# Patient Record
Sex: MALE | Race: WHITE | NOT HISPANIC OR LATINO | Employment: FULL TIME | URBAN - METROPOLITAN AREA
[De-identification: names, ages, dates, MRNs, and addresses within clinical notes are randomized per-mention and may not be internally consistent; named-entity substitution may affect disease eponyms.]

---

## 2022-05-04 ENCOUNTER — TELEPHONE (OUTPATIENT)
Dept: ONCOLOGY | Facility: CLINIC | Age: 62
End: 2022-05-04

## 2022-05-04 ENCOUNTER — APPOINTMENT (OUTPATIENT)
Dept: CARDIOLOGY | Facility: HOSPITAL | Age: 62
End: 2022-05-04

## 2022-05-04 ENCOUNTER — HOSPITAL ENCOUNTER (EMERGENCY)
Facility: HOSPITAL | Age: 62
Discharge: HOME | End: 2022-05-04
Attending: EMERGENCY MEDICINE | Admitting: EMERGENCY MEDICINE

## 2022-05-04 VITALS
BODY MASS INDEX: 24.92 KG/M2 | TEMPERATURE: 97.7 F | OXYGEN SATURATION: 96 % | WEIGHT: 184 LBS | RESPIRATION RATE: 18 BRPM | HEART RATE: 69 BPM | DIASTOLIC BLOOD PRESSURE: 93 MMHG | SYSTOLIC BLOOD PRESSURE: 158 MMHG | HEIGHT: 72 IN

## 2022-05-04 DIAGNOSIS — I82.4Z1 LOWER LEG DVT (DEEP VENOUS THROMBOEMBOLISM), ACUTE, RIGHT: Primary | ICD-10-CM

## 2022-05-04 LAB
BH CV LOW VAS RIGHT DISTAL FEMORAL SPONT: 1
BH CV LOW VAS RIGHT GASTRONEMIUS VESSEL: 1
BH CV LOW VAS RIGHT MID FEMORAL SPONT: 1
BH CV LOW VAS RIGHT PERONEAL VESSEL: 1
BH CV LOW VAS RIGHT POPLITEAL SPONT: 1
BH CV LOW VAS RIGHT POSTERIOR TIBIAL VESSEL: 1
BH CV LOWER VASCULAR LEFT COMMON FEMORAL AUGMENT: NORMAL
BH CV LOWER VASCULAR LEFT COMMON FEMORAL COMPRESS: NORMAL
BH CV LOWER VASCULAR LEFT COMMON FEMORAL PHASIC: NORMAL
BH CV LOWER VASCULAR LEFT COMMON FEMORAL SPONT: NORMAL
BH CV LOWER VASCULAR RIGHT COMMON FEMORAL AUGMENT: NORMAL
BH CV LOWER VASCULAR RIGHT COMMON FEMORAL COMPRESS: NORMAL
BH CV LOWER VASCULAR RIGHT COMMON FEMORAL PHASIC: NORMAL
BH CV LOWER VASCULAR RIGHT COMMON FEMORAL SPONT: NORMAL
BH CV LOWER VASCULAR RIGHT DISTAL FEMORAL COMPRESS: NORMAL
BH CV LOWER VASCULAR RIGHT DISTAL FEMORAL PHASIC: NORMAL
BH CV LOWER VASCULAR RIGHT DISTAL FEMORAL SPONT: NORMAL
BH CV LOWER VASCULAR RIGHT GASTRONEMIUS COMPRESS: NORMAL
BH CV LOWER VASCULAR RIGHT GREATER SAPH AK COMPRESS: NORMAL
BH CV LOWER VASCULAR RIGHT GREATER SAPH BK COMPRESS: NORMAL
BH CV LOWER VASCULAR RIGHT LESSER SAPH COMPRESS: NORMAL
BH CV LOWER VASCULAR RIGHT MID FEMORAL COMPRESS: NORMAL
BH CV LOWER VASCULAR RIGHT MID FEMORAL PHASIC: NORMAL
BH CV LOWER VASCULAR RIGHT MID FEMORAL SPONT: NORMAL
BH CV LOWER VASCULAR RIGHT PERONEAL COMPRESS: NORMAL
BH CV LOWER VASCULAR RIGHT POPLITEAL COMPRESS: NORMAL
BH CV LOWER VASCULAR RIGHT POPLITEAL PHASIC: NORMAL
BH CV LOWER VASCULAR RIGHT POPLITEAL SPONT: NORMAL
BH CV LOWER VASCULAR RIGHT POSTERIOR TIBIAL COMPRESS: NORMAL
BH CV LOWER VASCULAR RIGHT PROFUNDA FEMORAL COMPRESS: NORMAL
BH CV LOWER VASCULAR RIGHT PROFUNDA FEMORAL PHASIC: NORMAL
BH CV LOWER VASCULAR RIGHT PROFUNDA FEMORAL SPONT: NORMAL
BH CV LOWER VASCULAR RIGHT PROXIMAL FEMORAL COMPRESS: NORMAL
BH CV LOWER VASCULAR RIGHT PROXIMAL FEMORAL PHASIC: NORMAL
BH CV LOWER VASCULAR RIGHT PROXIMAL FEMORAL SPONT: NORMAL
BH CV LOWER VASCULAR RIGHT SAPHENOFEMORAL JUNCTION COMPRESS: NORMAL
BH CV LOWER VASCULAR RIGHT SAPHENOFEMORAL JUNCTION PHASIC: NORMAL
BH CV LOWER VASCULAR RIGHT SAPHENOFEMORAL JUNCTION SPONT: NORMAL
MAXIMAL PREDICTED HEART RATE: 157 BPM
STRESS TARGET HR: 133 BPM

## 2022-05-04 PROCEDURE — 93971 EXTREMITY STUDY: CPT

## 2022-05-04 PROCEDURE — 93971 EXTREMITY STUDY: CPT | Performed by: INTERNAL MEDICINE

## 2022-05-04 PROCEDURE — 99283 EMERGENCY DEPT VISIT LOW MDM: CPT

## 2022-05-04 RX ADMIN — APIXABAN 10 MG: 5 TABLET, FILM COATED ORAL at 13:20

## 2022-05-04 NOTE — CASE MANAGEMENT/SOCIAL WORK
Continued Stay Note  [facility]     Patient Name: Anibal Redding  MRN: [id number]  Today's Date: 5/4/2022    Admit Date: 5/4/2022     Discharge Plan     Row Name 05/04/22 1532       Plan    Plan SW follow up    Patient/Family in Agreement with Plan yes    Plan Comments Provided PA with an Eliquis discount card for pt. upon d/c. MSW is available.    Final Discharge Disposition Code 01 - home or self-care               Discharge Codes    No documentation.                     MARLENY Sam

## 2022-05-04 NOTE — ED PROVIDER NOTES
"Subjective   Mr. Redding is a 62-year-old male who presents to the emergency department with complaints of right lower leg pain and swelling.  The patient states that his symptoms began 3 days ago.  He recalls no injury.  The pain is mostly in the right posterior knee and in the right calf.  The patient notes that his mother has had multiple DVTs in the past.  The patient recently fractured his right foot about 8 weeks ago and was in a boot up until recently.  He states that his foot is now doing fine.  He also had a wart removed from the right posterior knee about 3 weeks ago.  He states they \"numbed it up and scraped it off\".  The patient denies any prior history of DVT.  No recent immobility other than some decreased activity from his foot fracture.  The patient denies any chest pain or shortness of breath.  Past medical history includes non-insulin-dependent diabetes and hypertension.  He has had a short segment of colon resected about 16 years ago secondary to diverticulitis.  He has had no GI bleeds or other GI issues since then.  He is a non-smoker.  No alcohol use.          Review of Systems   Constitutional: Negative for chills and fever.   HENT: Negative for sore throat.    Respiratory: Negative for cough and shortness of breath.    Cardiovascular: Negative for chest pain.   Gastrointestinal: Negative for abdominal pain, blood in stool, nausea and vomiting.   Genitourinary: Negative for dysuria.   Musculoskeletal:        Right posterior knee and right calf pain and swelling   Skin: Negative for rash.   Neurological: Negative for numbness.   Hematological: Does not bruise/bleed easily.   Psychiatric/Behavioral: Negative.        Past Medical History:   Diagnosis Date   • Arthritis    • Diabetes mellitus (HCC)    • Diverticulitis    • Hypertension        Allergies   Allergen Reactions   • Cefaclor Hives   • Amlodipine Other (See Comments)   • Benazepril Other (See Comments)     Rash on arms, neck       Past " Surgical History:   Procedure Laterality Date   • COLON SURGERY         History reviewed. No pertinent family history.    Social History     Socioeconomic History   • Marital status:    Tobacco Use   • Smoking status: Never Smoker   • Smokeless tobacco: Never Used   Vaping Use   • Vaping Use: Never used   Substance and Sexual Activity   • Alcohol use: Yes     Comment: once a month   • Drug use: Never   • Sexual activity: Defer           Objective   Physical Exam  Constitutional:       General: He is not in acute distress.     Appearance: Normal appearance.   HENT:      Head: Normocephalic.      Nose: Nose normal.      Mouth/Throat:      Mouth: Mucous membranes are moist.   Eyes:      General: No scleral icterus.     Conjunctiva/sclera: Conjunctivae normal.      Pupils: Pupils are equal, round, and reactive to light.   Cardiovascular:      Rate and Rhythm: Normal rate and regular rhythm.      Pulses: Normal pulses.      Comments: Normal pedal pulses  Pulmonary:      Effort: Pulmonary effort is normal.   Abdominal:      General: Bowel sounds are normal.      Tenderness: There is no abdominal tenderness. There is no guarding.   Musculoskeletal:      Cervical back: Normal range of motion and neck supple.      Comments: Moderate right lower leg swelling.  Moderate tenderness on palpation of the right calf and right popliteal fossa.  No palpable cords.   Skin:     General: Skin is warm and dry.      Findings: No erythema or rash.   Neurological:      General: No focal deficit present.      Mental Status: He is alert and oriented to person, place, and time.   Psychiatric:         Mood and Affect: Mood normal.         Procedures           ED Course      The patient was sent for Doppler ultrasound of the right lower extremity.  The patient declined anything for pain at this time.    13:12 EDT  Doppler ultrasound right lower extremity:  Right mid-distal femoral vein, popliteal vein, gastrocnemius vein, posterior tibial  "vein, and peroneal vein appears positive for thrombus.    I spoke with the patient about his ultrasound results.  I will plan to start him on Eliquis and have him follow-up with his PCP and with hematology.  The patient has been advised to avoid massaging the leg.  He has been advised to return to the emergency department if he has chest pain or shortness of breath or other acute concerns.  He is to follow-up with his PCP and I will refer to hematology as well.    13:12 EDT  I spoke with Dr. Anne-Marie Jacob by phone about the pt.  She advised that they would be happy to see the pt in follow up.  She advised to refer the pt in Epic and their  would \"take it from there\".                                               MDM    Final diagnoses:   Lower leg DVT (deep venous thromboembolism), acute, right (HCC)       ED Disposition  ED Disposition     ED Disposition   Discharge    Condition   Stable    Comment   --             Andrew Powell MD  [address] [facility] [address] [phone]      Call today for follow-up appointment    Anne-Marie Jacob MD  [address] [facility] [address]  [place] [id number]  [phone]      call for follow up in office for evaluation for possible blood clotting disorder         Medication List      New Prescriptions    Apixaban Starter Pack tablet therapy pack  Take two 5 mg tablets by mouth every 12 hours for 7 days. Followed by one 5 mg tablet every 12 hours. (Dispense starter pack if available)        Stop    aspirin 325 MG tablet     celecoxib 200 MG capsule  Commonly known as: CeleBREX     rosuvastatin 10 MG tablet  Commonly known as: CRESTOR           Where to Get Your Medications      These medications were sent to [facility] - [place], [place] - [address] - [phone]  - [phone] FX  [address] [facility] [address]    Phone: [phone]   · Apixaban Starter Pack tablet therapy pack          Pablo Henriquez, " PA  05/04/22 4907

## 2022-05-04 NOTE — TELEPHONE ENCOUNTER
Caller: ELIZ COSTELLO    Relationship: Emergency Contact    Best call back number: [phone]    What is the best time to reach you: ASAP    Who are you requesting to speak with (clinical staff, provider,  specific staff member):     Do you know the name of the person who called:     What was the call regarding: PT WANTS TO SCHEDULE FOLLOW UP    Do you require a callback: YES

## 2022-06-30 ENCOUNTER — APPOINTMENT (OUTPATIENT)
Dept: CT IMAGING | Facility: HOSPITAL | Age: 62
End: 2022-06-30

## 2022-06-30 ENCOUNTER — HOSPITAL ENCOUNTER (OUTPATIENT)
Facility: HOSPITAL | Age: 62
Discharge: HOME | End: 2022-07-02
Attending: STUDENT IN AN ORGANIZED HEALTH CARE EDUCATION/TRAINING PROGRAM | Admitting: SURGERY

## 2022-06-30 DIAGNOSIS — E87.20 LACTIC ACIDOSIS: ICD-10-CM

## 2022-06-30 DIAGNOSIS — E11.65 HYPERGLYCEMIA DUE TO DIABETES MELLITUS: ICD-10-CM

## 2022-06-30 DIAGNOSIS — R10.9 ABDOMINAL PAIN: ICD-10-CM

## 2022-06-30 DIAGNOSIS — K81.0 ACUTE CHOLECYSTITIS: Primary | ICD-10-CM

## 2022-06-30 LAB
ALBUMIN SERPL-MCNC: 4.2 G/DL (ref 3.5–5.2)
ALBUMIN/GLOB SERPL: 1.5 G/DL
ALP SERPL-CCNC: 93 U/L (ref 39–117)
ALT SERPL W P-5'-P-CCNC: 11 U/L (ref 1–41)
ANION GAP SERPL CALCULATED.3IONS-SCNC: 8 MMOL/L (ref 5–15)
AST SERPL-CCNC: 13 U/L (ref 1–40)
BASOPHILS # BLD AUTO: 0.07 10*3/MM3 (ref 0–0.2)
BASOPHILS NFR BLD AUTO: 0.8 % (ref 0–1.5)
BILIRUB SERPL-MCNC: 0.6 MG/DL (ref 0–1.2)
BUN SERPL-MCNC: 14 MG/DL (ref 8–23)
BUN/CREAT SERPL: 12.3 (ref 7–25)
CALCIUM SPEC-SCNC: 9.4 MG/DL (ref 8.6–10.5)
CHLORIDE SERPL-SCNC: 95 MMOL/L (ref 98–107)
CO2 SERPL-SCNC: 29 MMOL/L (ref 22–29)
CREAT SERPL-MCNC: 1.14 MG/DL (ref 0.76–1.27)
D-LACTATE SERPL-SCNC: 1.8 MMOL/L (ref 0.5–2)
DEPRECATED RDW RBC AUTO: 38 FL (ref 37–54)
EGFRCR SERPLBLD CKD-EPI 2021: 72.3 ML/MIN/1.73
EOSINOPHIL # BLD AUTO: 0.14 10*3/MM3 (ref 0–0.4)
EOSINOPHIL NFR BLD AUTO: 1.5 % (ref 0.3–6.2)
ERYTHROCYTE [DISTWIDTH] IN BLOOD BY AUTOMATED COUNT: 13.1 % (ref 12.3–15.4)
GLOBULIN UR ELPH-MCNC: 2.8 GM/DL
GLUCOSE SERPL-MCNC: 271 MG/DL (ref 65–99)
HCT VFR BLD AUTO: 47.7 % (ref 37.5–51)
HGB BLD-MCNC: 16.4 G/DL (ref 13–17.7)
HOLD SPECIMEN: NORMAL
IMM GRANULOCYTES # BLD AUTO: 0.04 10*3/MM3 (ref 0–0.05)
IMM GRANULOCYTES NFR BLD AUTO: 0.4 % (ref 0–0.5)
LIPASE SERPL-CCNC: 23 U/L (ref 13–60)
LYMPHOCYTES # BLD AUTO: 1.14 10*3/MM3 (ref 0.7–3.1)
LYMPHOCYTES NFR BLD AUTO: 12.4 % (ref 19.6–45.3)
MCH RBC QN AUTO: 27.9 PG (ref 26.6–33)
MCHC RBC AUTO-ENTMCNC: 34.4 G/DL (ref 31.5–35.7)
MCV RBC AUTO: 81.3 FL (ref 79–97)
MONOCYTES # BLD AUTO: 0.59 10*3/MM3 (ref 0.1–0.9)
MONOCYTES NFR BLD AUTO: 6.4 % (ref 5–12)
NEUTROPHILS NFR BLD AUTO: 7.18 10*3/MM3 (ref 1.7–7)
NEUTROPHILS NFR BLD AUTO: 78.5 % (ref 42.7–76)
NRBC BLD AUTO-RTO: 0 /100 WBC (ref 0–0.2)
PLATELET # BLD AUTO: 145 10*3/MM3 (ref 140–450)
PMV BLD AUTO: 8.8 FL (ref 6–12)
POTASSIUM SERPL-SCNC: 3.8 MMOL/L (ref 3.5–5.2)
PROT SERPL-MCNC: 7 G/DL (ref 6–8.5)
RBC # BLD AUTO: 5.87 10*6/MM3 (ref 4.14–5.8)
SODIUM SERPL-SCNC: 132 MMOL/L (ref 136–145)
WBC NRBC COR # BLD: 9.16 10*3/MM3 (ref 3.4–10.8)
WHOLE BLOOD HOLD COAG: NORMAL
WHOLE BLOOD HOLD SPECIMEN: NORMAL

## 2022-06-30 PROCEDURE — 25010000002 ONDANSETRON PER 1 MG: Performed by: STUDENT IN AN ORGANIZED HEALTH CARE EDUCATION/TRAINING PROGRAM

## 2022-06-30 PROCEDURE — 71275 CT ANGIOGRAPHY CHEST: CPT

## 2022-06-30 PROCEDURE — 99284 EMERGENCY DEPT VISIT MOD MDM: CPT

## 2022-06-30 PROCEDURE — 96361 HYDRATE IV INFUSION ADD-ON: CPT

## 2022-06-30 PROCEDURE — 85025 COMPLETE CBC W/AUTO DIFF WBC: CPT

## 2022-06-30 PROCEDURE — 80053 COMPREHEN METABOLIC PANEL: CPT

## 2022-06-30 PROCEDURE — 83690 ASSAY OF LIPASE: CPT

## 2022-06-30 PROCEDURE — 96375 TX/PRO/DX INJ NEW DRUG ADDON: CPT

## 2022-06-30 PROCEDURE — 0 IOPAMIDOL PER 1 ML: Performed by: STUDENT IN AN ORGANIZED HEALTH CARE EDUCATION/TRAINING PROGRAM

## 2022-06-30 PROCEDURE — 83605 ASSAY OF LACTIC ACID: CPT

## 2022-06-30 PROCEDURE — 25010000002 KETOROLAC TROMETHAMINE PER 15 MG: Performed by: STUDENT IN AN ORGANIZED HEALTH CARE EDUCATION/TRAINING PROGRAM

## 2022-06-30 PROCEDURE — 74177 CT ABD & PELVIS W/CONTRAST: CPT

## 2022-06-30 RX ORDER — KETOROLAC TROMETHAMINE 15 MG/ML
15 INJECTION, SOLUTION INTRAMUSCULAR; INTRAVENOUS ONCE
Status: COMPLETED | OUTPATIENT
Start: 2022-06-30 | End: 2022-06-30

## 2022-06-30 RX ORDER — SODIUM CHLORIDE 9 MG/ML
10 INJECTION INTRAVENOUS AS NEEDED
Status: DISCONTINUED | OUTPATIENT
Start: 2022-06-30 | End: 2022-07-02 | Stop reason: HOSPADM

## 2022-06-30 RX ORDER — ONDANSETRON 2 MG/ML
4 INJECTION INTRAMUSCULAR; INTRAVENOUS ONCE
Status: COMPLETED | OUTPATIENT
Start: 2022-06-30 | End: 2022-06-30

## 2022-06-30 RX ADMIN — IOPAMIDOL 100 ML: 755 INJECTION, SOLUTION INTRAVENOUS at 22:56

## 2022-06-30 RX ADMIN — KETOROLAC TROMETHAMINE 15 MG: 15 INJECTION, SOLUTION INTRAMUSCULAR; INTRAVENOUS at 23:47

## 2022-06-30 RX ADMIN — ONDANSETRON 4 MG: 2 INJECTION INTRAMUSCULAR; INTRAVENOUS at 23:47

## 2022-06-30 RX ADMIN — SODIUM CHLORIDE, POTASSIUM CHLORIDE, SODIUM LACTATE AND CALCIUM CHLORIDE 1000 ML: 600; 310; 30; 20 INJECTION, SOLUTION INTRAVENOUS at 23:50

## 2022-07-01 ENCOUNTER — ANESTHESIA (OUTPATIENT)
Dept: PERIOP | Facility: HOSPITAL | Age: 62
End: 2022-07-01

## 2022-07-01 ENCOUNTER — ANESTHESIA EVENT (OUTPATIENT)
Dept: PERIOP | Facility: HOSPITAL | Age: 62
End: 2022-07-01

## 2022-07-01 ENCOUNTER — APPOINTMENT (OUTPATIENT)
Dept: GENERAL RADIOLOGY | Facility: HOSPITAL | Age: 62
End: 2022-07-01

## 2022-07-01 PROBLEM — E87.1 HYPONATREMIA: Status: ACTIVE | Noted: 2022-07-01

## 2022-07-01 PROBLEM — E78.5 HYPERLIPIDEMIA: Status: ACTIVE | Noted: 2022-07-01

## 2022-07-01 PROBLEM — I82.409 DVT (DEEP VENOUS THROMBOSIS): Status: ACTIVE | Noted: 2022-07-01

## 2022-07-01 PROBLEM — K81.0 ACUTE CHOLECYSTITIS: Status: ACTIVE | Noted: 2022-07-01

## 2022-07-01 PROBLEM — E11.9 TYPE 2 DIABETES MELLITUS: Status: ACTIVE | Noted: 2022-07-01

## 2022-07-01 PROBLEM — I10 ESSENTIAL HYPERTENSION: Status: ACTIVE | Noted: 2022-07-01

## 2022-07-01 LAB
ANION GAP SERPL CALCULATED.3IONS-SCNC: 9 MMOL/L (ref 5–15)
BASOPHILS # BLD AUTO: 0.03 10*3/MM3 (ref 0–0.2)
BASOPHILS NFR BLD AUTO: 0.3 % (ref 0–1.5)
BUN SERPL-MCNC: 11 MG/DL (ref 8–23)
BUN/CREAT SERPL: 11.2 (ref 7–25)
CALCIUM SPEC-SCNC: 8.3 MG/DL (ref 8.6–10.5)
CHLORIDE SERPL-SCNC: 101 MMOL/L (ref 98–107)
CHOLEST SERPL-MCNC: 202 MG/DL (ref 0–200)
CO2 SERPL-SCNC: 25 MMOL/L (ref 22–29)
CREAT SERPL-MCNC: 0.98 MG/DL (ref 0.76–1.27)
D-LACTATE SERPL-SCNC: 1.2 MMOL/L (ref 0.5–2)
D-LACTATE SERPL-SCNC: 3.6 MMOL/L (ref 0.5–2)
DEPRECATED RDW RBC AUTO: 39.3 FL (ref 37–54)
EGFRCR SERPLBLD CKD-EPI 2021: 86.6 ML/MIN/1.73
EOSINOPHIL # BLD AUTO: 0.12 10*3/MM3 (ref 0–0.4)
EOSINOPHIL NFR BLD AUTO: 1.4 % (ref 0.3–6.2)
ERYTHROCYTE [DISTWIDTH] IN BLOOD BY AUTOMATED COUNT: 13.3 % (ref 12.3–15.4)
FLUAV RNA RESP QL NAA+PROBE: NOT DETECTED
FLUBV RNA RESP QL NAA+PROBE: NOT DETECTED
GLUCOSE BLDC GLUCOMTR-MCNC: 156 MG/DL (ref 70–130)
GLUCOSE BLDC GLUCOMTR-MCNC: 177 MG/DL (ref 70–130)
GLUCOSE BLDC GLUCOMTR-MCNC: 191 MG/DL (ref 70–130)
GLUCOSE BLDC GLUCOMTR-MCNC: 354 MG/DL (ref 70–130)
GLUCOSE SERPL-MCNC: 181 MG/DL (ref 65–99)
HBA1C MFR BLD: 8.9 % (ref 4.8–5.6)
HCT VFR BLD AUTO: 42.8 % (ref 37.5–51)
HDLC SERPL-MCNC: 25 MG/DL (ref 40–60)
HGB BLD-MCNC: 14.5 G/DL (ref 13–17.7)
IMM GRANULOCYTES # BLD AUTO: 0.03 10*3/MM3 (ref 0–0.05)
IMM GRANULOCYTES NFR BLD AUTO: 0.3 % (ref 0–0.5)
LDLC SERPL CALC-MCNC: 89 MG/DL (ref 0–100)
LDLC/HDLC SERPL: 2.82 {RATIO}
LYMPHOCYTES # BLD AUTO: 0.78 10*3/MM3 (ref 0.7–3.1)
LYMPHOCYTES NFR BLD AUTO: 9.1 % (ref 19.6–45.3)
MCH RBC QN AUTO: 27.7 PG (ref 26.6–33)
MCHC RBC AUTO-ENTMCNC: 33.9 G/DL (ref 31.5–35.7)
MCV RBC AUTO: 81.7 FL (ref 79–97)
MONOCYTES # BLD AUTO: 0.69 10*3/MM3 (ref 0.1–0.9)
MONOCYTES NFR BLD AUTO: 8 % (ref 5–12)
NEUTROPHILS NFR BLD AUTO: 6.93 10*3/MM3 (ref 1.7–7)
NEUTROPHILS NFR BLD AUTO: 80.9 % (ref 42.7–76)
NRBC BLD AUTO-RTO: 0 /100 WBC (ref 0–0.2)
PLATELET # BLD AUTO: 130 10*3/MM3 (ref 140–450)
PMV BLD AUTO: 9.1 FL (ref 6–12)
POTASSIUM SERPL-SCNC: 3.6 MMOL/L (ref 3.5–5.2)
QT INTERVAL: 400 MS
QTC INTERVAL: 452 MS
RBC # BLD AUTO: 5.24 10*6/MM3 (ref 4.14–5.8)
SARS-COV-2 RNA RESP QL NAA+PROBE: NOT DETECTED
SODIUM SERPL-SCNC: 135 MMOL/L (ref 136–145)
TRIGL SERPL-MCNC: 533 MG/DL (ref 0–150)
VLDLC SERPL-MCNC: 88 MG/DL (ref 5–40)
WBC NRBC COR # BLD: 8.58 10*3/MM3 (ref 3.4–10.8)

## 2022-07-01 PROCEDURE — 63710000001 INSULIN LISPRO (HUMAN) PER 5 UNITS: Performed by: PHYSICIAN ASSISTANT

## 2022-07-01 PROCEDURE — 25010000002 PIPERACILLIN SOD-TAZOBACTAM PER 1 G

## 2022-07-01 PROCEDURE — 63710000001 INSULIN LISPRO (HUMAN) PER 5 UNITS: Performed by: SURGERY

## 2022-07-01 PROCEDURE — 88304 TISSUE EXAM BY PATHOLOGIST: CPT | Performed by: SURGERY

## 2022-07-01 PROCEDURE — 87636 SARSCOV2 & INF A&B AMP PRB: CPT | Performed by: INTERNAL MEDICINE

## 2022-07-01 PROCEDURE — 80048 BASIC METABOLIC PNL TOTAL CA: CPT | Performed by: PHYSICIAN ASSISTANT

## 2022-07-01 PROCEDURE — 25010000002 IOPAMIDOL 61 % SOLUTION: Performed by: SURGERY

## 2022-07-01 PROCEDURE — 0 LIDOCAINE 1 % SOLUTION: Performed by: NURSE ANESTHETIST, CERTIFIED REGISTERED

## 2022-07-01 PROCEDURE — 25010000002 HYDROMORPHONE 1 MG/ML SOLUTION

## 2022-07-01 PROCEDURE — 93010 ELECTROCARDIOGRAM REPORT: CPT | Performed by: INTERNAL MEDICINE

## 2022-07-01 PROCEDURE — G0378 HOSPITAL OBSERVATION PER HR: HCPCS

## 2022-07-01 PROCEDURE — 25010000002 DEXAMETHASONE PER 1 MG: Performed by: NURSE ANESTHETIST, CERTIFIED REGISTERED

## 2022-07-01 PROCEDURE — 63710000001 INSULIN LISPRO (HUMAN) PER 5 UNITS: Performed by: NURSE PRACTITIONER

## 2022-07-01 PROCEDURE — 96365 THER/PROPH/DIAG IV INF INIT: CPT

## 2022-07-01 PROCEDURE — 25010000002 PIPERACILLIN SOD-TAZOBACTAM PER 1 G: Performed by: SURGERY

## 2022-07-01 PROCEDURE — 96375 TX/PRO/DX INJ NEW DRUG ADDON: CPT

## 2022-07-01 PROCEDURE — 25010000002 ONDANSETRON PER 1 MG: Performed by: NURSE ANESTHETIST, CERTIFIED REGISTERED

## 2022-07-01 PROCEDURE — 96376 TX/PRO/DX INJ SAME DRUG ADON: CPT

## 2022-07-01 PROCEDURE — 96361 HYDRATE IV INFUSION ADD-ON: CPT

## 2022-07-01 PROCEDURE — 25010000002 HYDROMORPHONE PER 4 MG: Performed by: INTERNAL MEDICINE

## 2022-07-01 PROCEDURE — 25010000002 PIPERACILLIN SOD-TAZOBACTAM PER 1 G: Performed by: STUDENT IN AN ORGANIZED HEALTH CARE EDUCATION/TRAINING PROGRAM

## 2022-07-01 PROCEDURE — 85025 COMPLETE CBC W/AUTO DIFF WBC: CPT | Performed by: PHYSICIAN ASSISTANT

## 2022-07-01 PROCEDURE — 25010000002 DIPHENHYDRAMINE PER 50 MG: Performed by: STUDENT IN AN ORGANIZED HEALTH CARE EDUCATION/TRAINING PROGRAM

## 2022-07-01 PROCEDURE — 99220 PR INITIAL OBSERVATION CARE/DAY 70 MINUTES: CPT | Performed by: PHYSICIAN ASSISTANT

## 2022-07-01 PROCEDURE — 25010000002 FENTANYL CITRATE (PF) 50 MCG/ML SOLUTION: Performed by: NURSE ANESTHETIST, CERTIFIED REGISTERED

## 2022-07-01 PROCEDURE — 25010000002 FENTANYL CITRATE (PF) 50 MCG/ML SOLUTION

## 2022-07-01 PROCEDURE — 93005 ELECTROCARDIOGRAM TRACING: CPT | Performed by: ANESTHESIOLOGY

## 2022-07-01 PROCEDURE — 87040 BLOOD CULTURE FOR BACTERIA: CPT | Performed by: STUDENT IN AN ORGANIZED HEALTH CARE EDUCATION/TRAINING PROGRAM

## 2022-07-01 PROCEDURE — 82962 GLUCOSE BLOOD TEST: CPT

## 2022-07-01 PROCEDURE — 83036 HEMOGLOBIN GLYCOSYLATED A1C: CPT | Performed by: PHYSICIAN ASSISTANT

## 2022-07-01 PROCEDURE — C9803 HOPD COVID-19 SPEC COLLECT: HCPCS

## 2022-07-01 PROCEDURE — 74300 X-RAY BILE DUCTS/PANCREAS: CPT

## 2022-07-01 PROCEDURE — 80061 LIPID PANEL: CPT | Performed by: PHYSICIAN ASSISTANT

## 2022-07-01 PROCEDURE — 25010000002 PROPOFOL 10 MG/ML EMULSION: Performed by: NURSE ANESTHETIST, CERTIFIED REGISTERED

## 2022-07-01 PROCEDURE — 83605 ASSAY OF LACTIC ACID: CPT | Performed by: STUDENT IN AN ORGANIZED HEALTH CARE EDUCATION/TRAINING PROGRAM

## 2022-07-01 DEVICE — LIGACLIP 10-M/L, 10MM ENDOSCOPIC ROTATING MULTIPLE CLIP APPLIERS
Type: IMPLANTABLE DEVICE | Site: ABDOMEN | Status: FUNCTIONAL
Brand: LIGACLIP

## 2022-07-01 RX ORDER — ONDANSETRON 2 MG/ML
INJECTION INTRAMUSCULAR; INTRAVENOUS AS NEEDED
Status: DISCONTINUED | OUTPATIENT
Start: 2022-07-01 | End: 2022-07-01 | Stop reason: SURG

## 2022-07-01 RX ORDER — METOPROLOL TARTRATE 100 MG/1
100 TABLET ORAL 2 TIMES DAILY
Status: DISCONTINUED | OUTPATIENT
Start: 2022-07-01 | End: 2022-07-02 | Stop reason: HOSPADM

## 2022-07-01 RX ORDER — FENTANYL CITRATE 50 UG/ML
INJECTION, SOLUTION INTRAMUSCULAR; INTRAVENOUS
Status: COMPLETED
Start: 2022-07-01 | End: 2022-07-01

## 2022-07-01 RX ORDER — SODIUM CHLORIDE 9 MG/ML
INJECTION, SOLUTION INTRAVENOUS AS NEEDED
Status: DISCONTINUED | OUTPATIENT
Start: 2022-07-01 | End: 2022-07-01 | Stop reason: HOSPADM

## 2022-07-01 RX ORDER — NICOTINE POLACRILEX 4 MG
15 LOZENGE BUCCAL
Status: DISCONTINUED | OUTPATIENT
Start: 2022-07-01 | End: 2022-07-02 | Stop reason: HOSPADM

## 2022-07-01 RX ORDER — DEXTROSE MONOHYDRATE 25 G/50ML
25 INJECTION, SOLUTION INTRAVENOUS
Status: DISCONTINUED | OUTPATIENT
Start: 2022-07-01 | End: 2022-07-02 | Stop reason: HOSPADM

## 2022-07-01 RX ORDER — FAMOTIDINE 20 MG/1
20 TABLET, FILM COATED ORAL ONCE
Status: COMPLETED | OUTPATIENT
Start: 2022-07-01 | End: 2022-07-01

## 2022-07-01 RX ORDER — HYDROMORPHONE HYDROCHLORIDE 1 MG/ML
0.5 INJECTION, SOLUTION INTRAMUSCULAR; INTRAVENOUS; SUBCUTANEOUS EVERY 4 HOURS PRN
Status: DISCONTINUED | OUTPATIENT
Start: 2022-07-01 | End: 2022-07-01 | Stop reason: ALTCHOICE

## 2022-07-01 RX ORDER — DOCUSATE SODIUM 100 MG/1
100 CAPSULE, LIQUID FILLED ORAL 2 TIMES DAILY
Status: DISCONTINUED | OUTPATIENT
Start: 2022-07-01 | End: 2022-07-02 | Stop reason: HOSPADM

## 2022-07-01 RX ORDER — IPRATROPIUM BROMIDE AND ALBUTEROL SULFATE 2.5; .5 MG/3ML; MG/3ML
3 SOLUTION RESPIRATORY (INHALATION) ONCE AS NEEDED
Status: DISCONTINUED | OUTPATIENT
Start: 2022-07-01 | End: 2022-07-01 | Stop reason: HOSPADM

## 2022-07-01 RX ORDER — ROCURONIUM BROMIDE 10 MG/ML
INJECTION, SOLUTION INTRAVENOUS AS NEEDED
Status: DISCONTINUED | OUTPATIENT
Start: 2022-07-01 | End: 2022-07-01 | Stop reason: SURG

## 2022-07-01 RX ORDER — TAMSULOSIN HYDROCHLORIDE 0.4 MG/1
0.4 CAPSULE ORAL DAILY
Status: DISCONTINUED | OUTPATIENT
Start: 2022-07-01 | End: 2022-07-02 | Stop reason: HOSPADM

## 2022-07-01 RX ORDER — SODIUM CHLORIDE 0.9 % (FLUSH) 0.9 %
10 SYRINGE (ML) INJECTION EVERY 12 HOURS SCHEDULED
Status: DISCONTINUED | OUTPATIENT
Start: 2022-07-01 | End: 2022-07-02 | Stop reason: HOSPADM

## 2022-07-01 RX ORDER — DIPHENHYDRAMINE HYDROCHLORIDE 50 MG/ML
25 INJECTION INTRAMUSCULAR; INTRAVENOUS ONCE
Status: COMPLETED | OUTPATIENT
Start: 2022-07-01 | End: 2022-07-01

## 2022-07-01 RX ORDER — DEXAMETHASONE SODIUM PHOSPHATE 4 MG/ML
INJECTION, SOLUTION INTRA-ARTICULAR; INTRALESIONAL; INTRAMUSCULAR; INTRAVENOUS; SOFT TISSUE AS NEEDED
Status: DISCONTINUED | OUTPATIENT
Start: 2022-07-01 | End: 2022-07-01 | Stop reason: SURG

## 2022-07-01 RX ORDER — ACETAMINOPHEN 325 MG/1
650 TABLET ORAL EVERY 4 HOURS PRN
Status: DISCONTINUED | OUTPATIENT
Start: 2022-07-01 | End: 2022-07-02 | Stop reason: HOSPADM

## 2022-07-01 RX ORDER — BUPIVACAINE HCL/0.9 % NACL/PF 0.125 %
PLASTIC BAG, INJECTION (ML) EPIDURAL AS NEEDED
Status: DISCONTINUED | OUTPATIENT
Start: 2022-07-01 | End: 2022-07-01 | Stop reason: SURG

## 2022-07-01 RX ORDER — LOSARTAN POTASSIUM 50 MG/1
50 TABLET ORAL 2 TIMES DAILY
Status: DISCONTINUED | OUTPATIENT
Start: 2022-07-01 | End: 2022-07-02 | Stop reason: HOSPADM

## 2022-07-01 RX ORDER — HYDROMORPHONE HYDROCHLORIDE 1 MG/ML
0.5 INJECTION, SOLUTION INTRAMUSCULAR; INTRAVENOUS; SUBCUTANEOUS
Status: DISCONTINUED | OUTPATIENT
Start: 2022-07-01 | End: 2022-07-01 | Stop reason: HOSPADM

## 2022-07-01 RX ORDER — BUPIVACAINE HYDROCHLORIDE AND EPINEPHRINE 2.5; 5 MG/ML; UG/ML
INJECTION, SOLUTION EPIDURAL; INFILTRATION; INTRACAUDAL; PERINEURAL AS NEEDED
Status: DISCONTINUED | OUTPATIENT
Start: 2022-07-01 | End: 2022-07-01 | Stop reason: HOSPADM

## 2022-07-01 RX ORDER — INSULIN LISPRO 100 [IU]/ML
0-9 INJECTION, SOLUTION INTRAVENOUS; SUBCUTANEOUS
Status: DISCONTINUED | OUTPATIENT
Start: 2022-07-01 | End: 2022-07-01

## 2022-07-01 RX ORDER — ONDANSETRON 2 MG/ML
4 INJECTION INTRAMUSCULAR; INTRAVENOUS ONCE AS NEEDED
Status: DISCONTINUED | OUTPATIENT
Start: 2022-07-01 | End: 2022-07-01 | Stop reason: HOSPADM

## 2022-07-01 RX ORDER — FENTANYL CITRATE 50 UG/ML
INJECTION, SOLUTION INTRAMUSCULAR; INTRAVENOUS AS NEEDED
Status: DISCONTINUED | OUTPATIENT
Start: 2022-07-01 | End: 2022-07-01 | Stop reason: SURG

## 2022-07-01 RX ORDER — MEPERIDINE HYDROCHLORIDE 25 MG/ML
12.5 INJECTION INTRAMUSCULAR; INTRAVENOUS; SUBCUTANEOUS
Status: DISCONTINUED | OUTPATIENT
Start: 2022-07-01 | End: 2022-07-01 | Stop reason: HOSPADM

## 2022-07-01 RX ORDER — SIMETHICONE 80 MG
80 TABLET,CHEWABLE ORAL 4 TIMES DAILY PRN
Status: DISCONTINUED | OUTPATIENT
Start: 2022-07-01 | End: 2022-07-02 | Stop reason: HOSPADM

## 2022-07-01 RX ORDER — INSULIN LISPRO 100 [IU]/ML
0-9 INJECTION, SOLUTION INTRAVENOUS; SUBCUTANEOUS
Status: DISCONTINUED | OUTPATIENT
Start: 2022-07-01 | End: 2022-07-02 | Stop reason: HOSPADM

## 2022-07-01 RX ORDER — BISACODYL 5 MG/1
10 TABLET, DELAYED RELEASE ORAL DAILY
Status: DISCONTINUED | OUTPATIENT
Start: 2022-07-01 | End: 2022-07-02 | Stop reason: HOSPADM

## 2022-07-01 RX ORDER — PROPOFOL 10 MG/ML
VIAL (ML) INTRAVENOUS AS NEEDED
Status: DISCONTINUED | OUTPATIENT
Start: 2022-07-01 | End: 2022-07-01 | Stop reason: SURG

## 2022-07-01 RX ORDER — SODIUM CHLORIDE, SODIUM LACTATE, POTASSIUM CHLORIDE, CALCIUM CHLORIDE 600; 310; 30; 20 MG/100ML; MG/100ML; MG/100ML; MG/100ML
9 INJECTION, SOLUTION INTRAVENOUS CONTINUOUS
Status: DISCONTINUED | OUTPATIENT
Start: 2022-07-01 | End: 2022-07-02 | Stop reason: HOSPADM

## 2022-07-01 RX ORDER — SODIUM CHLORIDE, SODIUM LACTATE, POTASSIUM CHLORIDE, CALCIUM CHLORIDE 600; 310; 30; 20 MG/100ML; MG/100ML; MG/100ML; MG/100ML
INJECTION, SOLUTION INTRAVENOUS CONTINUOUS PRN
Status: DISCONTINUED | OUTPATIENT
Start: 2022-07-01 | End: 2022-07-01 | Stop reason: SURG

## 2022-07-01 RX ORDER — ONDANSETRON 2 MG/ML
4 INJECTION INTRAMUSCULAR; INTRAVENOUS EVERY 6 HOURS PRN
Status: DISCONTINUED | OUTPATIENT
Start: 2022-07-01 | End: 2022-07-02 | Stop reason: HOSPADM

## 2022-07-01 RX ORDER — HYDROMORPHONE HYDROCHLORIDE 1 MG/ML
0.2 INJECTION, SOLUTION INTRAMUSCULAR; INTRAVENOUS; SUBCUTANEOUS
Status: DISCONTINUED | OUTPATIENT
Start: 2022-07-01 | End: 2022-07-02 | Stop reason: HOSPADM

## 2022-07-01 RX ORDER — ONDANSETRON 4 MG/1
4 TABLET, FILM COATED ORAL EVERY 6 HOURS PRN
Status: DISCONTINUED | OUTPATIENT
Start: 2022-07-01 | End: 2022-07-02 | Stop reason: HOSPADM

## 2022-07-01 RX ORDER — LIDOCAINE HYDROCHLORIDE 10 MG/ML
INJECTION, SOLUTION INFILTRATION; PERINEURAL AS NEEDED
Status: DISCONTINUED | OUTPATIENT
Start: 2022-07-01 | End: 2022-07-01 | Stop reason: SURG

## 2022-07-01 RX ORDER — SODIUM CHLORIDE 9 MG/ML
75 INJECTION, SOLUTION INTRAVENOUS CONTINUOUS
Status: ACTIVE | OUTPATIENT
Start: 2022-07-01 | End: 2022-07-01

## 2022-07-01 RX ORDER — SODIUM CHLORIDE 0.9 % (FLUSH) 0.9 %
10 SYRINGE (ML) INJECTION AS NEEDED
Status: DISCONTINUED | OUTPATIENT
Start: 2022-07-01 | End: 2022-07-02 | Stop reason: HOSPADM

## 2022-07-01 RX ORDER — FENTANYL CITRATE 50 UG/ML
50 INJECTION, SOLUTION INTRAMUSCULAR; INTRAVENOUS
Status: DISCONTINUED | OUTPATIENT
Start: 2022-07-01 | End: 2022-07-01 | Stop reason: HOSPADM

## 2022-07-01 RX ORDER — FENOFIBRATE 145 MG/1
145 TABLET, COATED ORAL DAILY
Status: DISCONTINUED | OUTPATIENT
Start: 2022-07-01 | End: 2022-07-02 | Stop reason: HOSPADM

## 2022-07-01 RX ORDER — OXYCODONE HYDROCHLORIDE AND ACETAMINOPHEN 5; 325 MG/1; MG/1
2 TABLET ORAL EVERY 4 HOURS PRN
Status: DISCONTINUED | OUTPATIENT
Start: 2022-07-01 | End: 2022-07-02 | Stop reason: HOSPADM

## 2022-07-01 RX ORDER — CHOLECALCIFEROL (VITAMIN D3) 125 MCG
5 CAPSULE ORAL NIGHTLY PRN
Status: DISCONTINUED | OUTPATIENT
Start: 2022-07-01 | End: 2022-07-02 | Stop reason: HOSPADM

## 2022-07-01 RX ORDER — ONDANSETRON 2 MG/ML
4 INJECTION INTRAMUSCULAR; INTRAVENOUS EVERY 6 HOURS PRN
Status: DISCONTINUED | OUTPATIENT
Start: 2022-07-01 | End: 2022-07-01 | Stop reason: SDUPTHER

## 2022-07-01 RX ORDER — SODIUM CHLORIDE, SODIUM LACTATE, POTASSIUM CHLORIDE, CALCIUM CHLORIDE 600; 310; 30; 20 MG/100ML; MG/100ML; MG/100ML; MG/100ML
125 INJECTION, SOLUTION INTRAVENOUS CONTINUOUS
Status: DISCONTINUED | OUTPATIENT
Start: 2022-07-01 | End: 2022-07-01

## 2022-07-01 RX ADMIN — OXYCODONE HYDROCHLORIDE AND ACETAMINOPHEN 2 TABLET: 5; 325 TABLET ORAL at 20:40

## 2022-07-01 RX ADMIN — ONDANSETRON 4 MG: 2 INJECTION INTRAMUSCULAR; INTRAVENOUS at 14:45

## 2022-07-01 RX ADMIN — SODIUM CHLORIDE 1000 ML: 9 INJECTION, SOLUTION INTRAVENOUS at 07:00

## 2022-07-01 RX ADMIN — FENTANYL CITRATE 50 MCG: 50 INJECTION, SOLUTION INTRAMUSCULAR; INTRAVENOUS at 14:54

## 2022-07-01 RX ADMIN — HYDROMORPHONE HYDROCHLORIDE 0.5 MG: 1 INJECTION, SOLUTION INTRAMUSCULAR; INTRAVENOUS; SUBCUTANEOUS at 15:53

## 2022-07-01 RX ADMIN — SODIUM CHLORIDE, POTASSIUM CHLORIDE, SODIUM LACTATE AND CALCIUM CHLORIDE 125 ML/HR: 600; 310; 30; 20 INJECTION, SOLUTION INTRAVENOUS at 03:41

## 2022-07-01 RX ADMIN — FENTANYL CITRATE 50 MCG: 50 INJECTION, SOLUTION INTRAMUSCULAR; INTRAVENOUS at 15:47

## 2022-07-01 RX ADMIN — OXYCODONE HYDROCHLORIDE AND ACETAMINOPHEN 2 TABLET: 5; 325 TABLET ORAL at 16:39

## 2022-07-01 RX ADMIN — DOCUSATE SODIUM 100 MG: 100 CAPSULE, LIQUID FILLED ORAL at 20:10

## 2022-07-01 RX ADMIN — HYDROMORPHONE HYDROCHLORIDE 0.5 MG: 1 INJECTION, SOLUTION INTRAMUSCULAR; INTRAVENOUS; SUBCUTANEOUS at 04:26

## 2022-07-01 RX ADMIN — DIPHENHYDRAMINE HYDROCHLORIDE 25 MG: 50 INJECTION, SOLUTION INTRAMUSCULAR; INTRAVENOUS at 03:41

## 2022-07-01 RX ADMIN — SODIUM CHLORIDE, POTASSIUM CHLORIDE, SODIUM LACTATE AND CALCIUM CHLORIDE: 600; 310; 30; 20 INJECTION, SOLUTION INTRAVENOUS at 13:45

## 2022-07-01 RX ADMIN — ROCURONIUM BROMIDE 50 MG: 10 INJECTION, SOLUTION INTRAVENOUS at 13:46

## 2022-07-01 RX ADMIN — INSULIN LISPRO 2 UNITS: 100 INJECTION, SOLUTION INTRAVENOUS; SUBCUTANEOUS at 12:04

## 2022-07-01 RX ADMIN — SODIUM CHLORIDE, POTASSIUM CHLORIDE, SODIUM LACTATE AND CALCIUM CHLORIDE: 600; 310; 30; 20 INJECTION, SOLUTION INTRAVENOUS at 14:35

## 2022-07-01 RX ADMIN — Medication 100 MCG: at 14:16

## 2022-07-01 RX ADMIN — DEXAMETHASONE SODIUM PHOSPHATE 8 MG: 4 INJECTION, SOLUTION INTRA-ARTICULAR; INTRALESIONAL; INTRAMUSCULAR; INTRAVENOUS; SOFT TISSUE at 13:55

## 2022-07-01 RX ADMIN — FAMOTIDINE 20 MG: 20 TABLET ORAL at 12:43

## 2022-07-01 RX ADMIN — Medication 10 ML: at 12:43

## 2022-07-01 RX ADMIN — Medication 100 MCG: at 14:23

## 2022-07-01 RX ADMIN — LOSARTAN POTASSIUM 50 MG: 50 TABLET, FILM COATED ORAL at 08:14

## 2022-07-01 RX ADMIN — HYDROMORPHONE HYDROCHLORIDE 0.5 MG: 1 INJECTION, SOLUTION INTRAMUSCULAR; INTRAVENOUS; SUBCUTANEOUS at 08:13

## 2022-07-01 RX ADMIN — INSULIN LISPRO 2 UNITS: 100 INJECTION, SOLUTION INTRAVENOUS; SUBCUTANEOUS at 18:02

## 2022-07-01 RX ADMIN — PROPOFOL 50 MG: 10 INJECTION, EMULSION INTRAVENOUS at 14:42

## 2022-07-01 RX ADMIN — INSULIN LISPRO 2 UNITS: 100 INJECTION, SOLUTION INTRAVENOUS; SUBCUTANEOUS at 08:02

## 2022-07-01 RX ADMIN — SODIUM CHLORIDE 75 ML/HR: 9 INJECTION, SOLUTION INTRAVENOUS at 04:26

## 2022-07-01 RX ADMIN — TAZOBACTAM SODIUM AND PIPERACILLIN SODIUM 3.38 G: 375; 3 INJECTION, SOLUTION INTRAVENOUS at 03:41

## 2022-07-01 RX ADMIN — FENOFIBRATE 145 MG: 145 TABLET ORAL at 08:13

## 2022-07-01 RX ADMIN — BISACODYL 10 MG: 5 TABLET, COATED ORAL at 18:07

## 2022-07-01 RX ADMIN — FENTANYL CITRATE 50 MCG: 50 INJECTION, SOLUTION INTRAMUSCULAR; INTRAVENOUS at 14:42

## 2022-07-01 RX ADMIN — PROPOFOL 200 MG: 10 INJECTION, EMULSION INTRAVENOUS at 13:45

## 2022-07-01 RX ADMIN — LIDOCAINE HYDROCHLORIDE 50 MG: 10 INJECTION, SOLUTION INFILTRATION; PERINEURAL at 13:45

## 2022-07-01 RX ADMIN — SODIUM CHLORIDE, POTASSIUM CHLORIDE, SODIUM LACTATE AND CALCIUM CHLORIDE 9 ML/HR: 600; 310; 30; 20 INJECTION, SOLUTION INTRAVENOUS at 15:28

## 2022-07-01 RX ADMIN — METOPROLOL TARTRATE 100 MG: 100 TABLET, FILM COATED ORAL at 08:14

## 2022-07-01 RX ADMIN — TAMSULOSIN HYDROCHLORIDE 0.4 MG: 0.4 CAPSULE ORAL at 08:14

## 2022-07-01 RX ADMIN — TAZOBACTAM SODIUM AND PIPERACILLIN SODIUM 3.38 G: 375; 3 INJECTION, SOLUTION INTRAVENOUS at 10:45

## 2022-07-01 RX ADMIN — SUGAMMADEX 200 MG: 100 INJECTION, SOLUTION INTRAVENOUS at 14:52

## 2022-07-01 RX ADMIN — SODIUM CHLORIDE, POTASSIUM CHLORIDE, SODIUM LACTATE AND CALCIUM CHLORIDE 9 ML/HR: 600; 310; 30; 20 INJECTION, SOLUTION INTRAVENOUS at 12:43

## 2022-07-01 RX ADMIN — LOSARTAN POTASSIUM 50 MG: 50 TABLET, FILM COATED ORAL at 20:10

## 2022-07-01 RX ADMIN — INSULIN LISPRO 8 UNITS: 100 INJECTION, SOLUTION INTRAVENOUS; SUBCUTANEOUS at 20:51

## 2022-07-01 RX ADMIN — TAZOBACTAM SODIUM AND PIPERACILLIN SODIUM 3.38 G: 375; 3 INJECTION, SOLUTION INTRAVENOUS at 18:07

## 2022-07-01 RX ADMIN — Medication 100 MCG: at 13:57

## 2022-07-01 RX ADMIN — METOPROLOL TARTRATE 100 MG: 100 TABLET, FILM COATED ORAL at 20:10

## 2022-07-01 RX ADMIN — FENTANYL CITRATE 100 MCG: 50 INJECTION, SOLUTION INTRAMUSCULAR; INTRAVENOUS at 13:45

## 2022-07-01 NOTE — ANESTHESIA PROCEDURE NOTES
Airway  Urgency: elective    Date/Time: 7/1/2022 1:50 PM  Airway not difficult    General Information and Staff    Patient location during procedure: OR  CRNA/CAA: Rocio Mcclendon CRNA    Indications and Patient Condition  Indications for airway management: airway protection    Preoxygenated: yes  MILS not maintained throughout  Mask difficulty assessment: 1 - vent by mask    Final Airway Details  Final airway type: endotracheal airway      Successful airway: ETT  Cuffed: yes   Successful intubation technique: video laryngoscopy  Facilitating devices/methods: intubating stylet  Endotracheal tube insertion site: oral  Blade: Burr  ETT size (mm): 7.5  Cormack-Lehane Classification: grade I - full view of glottis  Placement verified by: chest auscultation and capnometry   Measured from: lips  ETT/EBT  to lips (cm): 20  Number of attempts at approach: 1  Assessment: lips, teeth, and gum same as pre-op and atraumatic intubation    Additional Comments  Negative epigastric sounds, Breath sound equal bilaterally with symmetric chest rise and fall  Pa

## 2022-07-01 NOTE — H&P
[facility] Medicine Services  Short Stay Unit (SSU)  HISTORY & PHYSICAL    Patient Name: Anibal Redding  : 1960  MRN: [id number]  Primary Care Physician: Andrew Powell MD  Date of admission: 2022  9:44 PM      Subjective   Subjective     Chief Complaint:  Epigastric abdominal pain    HPI:  Anibal Redding is a 62 y.o. male with a history of HTN, HLD, T2DM, and DVT who presents to [facility] ED for complaint of epigastric abdominal pain. He states it began suddenly yesterday evening, and worsened since onset. Denies any known aggravating or alleviating factors. Pain localized to the epigastric region, and described as as sharp stabbing pain. Denies fever chills, chest pain, shortness of breath, nausea or vomiting. He has a history of recent DVT (dx on 22) and has been on Eliquis ever since. He was concerned for a PE, so he came here for further evaluation and care.     Non-smoker. Has occasional alcohol use. Denies illicit drug use.       Review of Systems   Constitutional: Negative for activity change, appetite change, chills, fatigue, fever and unexpected weight change.   HENT: Negative for nosebleeds, postnasal drip, rhinorrhea and trouble swallowing.    Eyes: Negative for photophobia and visual disturbance.   Respiratory: Negative for cough, shortness of breath and wheezing.    Cardiovascular: Negative for chest pain and palpitations.   Gastrointestinal: Positive for abdominal pain (Epigastric). Negative for diarrhea, nausea and vomiting.   Genitourinary: Negative for dysuria and hematuria.   Musculoskeletal: Negative for arthralgias and myalgias.   Skin: Negative.    Neurological: Negative for tremors, syncope, speech difficulty and weakness.   Psychiatric/Behavioral: Negative for confusion. The patient is not nervous/anxious.       All other systems reviewed and negative    Personal History     Past Medical History:   Diagnosis Date   • Arthritis     • Diabetes mellitus (HCC)    • Diverticulitis    • Hypertension        Past Surgical History:   Procedure Laterality Date   • COLON SURGERY         Family History:  family history includes COPD in his father; Heart disease in his brother; Hyperlipidemia in his mother; Hypertension in his mother. Otherwise pertinent FHx was reviewed and unremarkable.     Social History:  reports that he has never smoked. He has never used smokeless tobacco. He reports current alcohol use. He reports that he does not use drugs.  Social History     Social History Narrative   • Not on file       Medications:  Available home medication information reviewed.  (Not in a hospital admission)      Allergies   Allergen Reactions   • Cefaclor Hives   • Amlodipine Other (See Comments)   • Benazepril Other (See Comments)     Rash on arms, neck       Objective   Objective     Vital Signs:   Temp:  [98 °F (36.7 °C)] 98 °F (36.7 °C)  Heart Rate:  [73-75] 75  Resp:  [16-18] 16  BP: (160-170)/(95-98) 170/98        Physical Exam  Constitutional:       General: He is not in acute distress.     Appearance: Normal appearance.   HENT:      Head: Atraumatic.      Right Ear: External ear normal.      Left Ear: External ear normal.      Nose: Nose normal.   Eyes:      Extraocular Movements: Extraocular movements intact.      Conjunctiva/sclera: Conjunctivae normal.      Pupils: Pupils are equal, round, and reactive to light.   Cardiovascular:      Rate and Rhythm: Normal rate and regular rhythm.      Pulses: Normal pulses.      Heart sounds: Normal heart sounds. No murmur heard.  Pulmonary:      Effort: Pulmonary effort is normal. No respiratory distress.      Breath sounds: Normal breath sounds. No wheezing, rhonchi or rales.   Abdominal:      General: Bowel sounds are normal. There is no distension.      Tenderness: There is abdominal tenderness (most tender across epigastric and RUQ region. Solomon sign positive). There is no guarding or rebound.    Musculoskeletal:         General: Tenderness: Mild tenderness in the right calf from known DVT. Normal range of motion.      Cervical back: No rigidity.      Right lower leg: No edema.      Left lower leg: No edema.   Skin:     General: Skin is warm and dry.      Coloration: Skin is not jaundiced.      Findings: No lesion or rash.   Neurological:      General: No focal deficit present.      Mental Status: He is alert and oriented to person, place, and time.   Psychiatric:         Attention and Perception: Attention normal.         Mood and Affect: Mood normal.         Behavior: Behavior normal.         Thought Content: Thought content normal.         Results Reviewed:   Latest Reference Range & Units 06/30/22 16:57   Glucose 65 - 99 mg/dL 271 (H)   Sodium 136 - 145 mmol/L 132 (L)   Potassium 3.5 - 5.2 mmol/L 3.8   CO2 22.0 - 29.0 mmol/L 29.0   Chloride 98 - 107 mmol/L 95 (L)   Anion Gap 5.0 - 15.0 mmol/L 8.0   Creatinine 0.76 - 1.27 mg/dL 1.14   BUN 8 - 23 mg/dL 14   BUN/Creatinine Ratio 7.0 - 25.0  12.3   Calcium 8.6 - 10.5 mg/dL 9.4   eGFR >60.0 mL/min/1.73 72.3   Alkaline Phosphatase 39 - 117 U/L 93   Total Protein 6.0 - 8.5 g/dL 7.0   ALT (SGPT) 1 - 41 U/L 11   AST (SGOT) 1 - 40 U/L 13   Total Bilirubin 0.0 - 1.2 mg/dL 0.6   Albumin 3.50 - 5.20 g/dL 4.20   Globulin gm/dL 2.8   A/G Ratio g/dL 1.5   Lactate 0.5 - 2.0 mmol/L 1.8   Lipase 13 - 60 U/L 23   WBC 3.40 - 10.80 10*3/mm3 9.16   RBC 4.14 - 5.80 10*6/mm3 5.87 (H)   Hemoglobin 13.0 - 17.7 g/dL 16.4   Hematocrit 37.5 - 51.0 % 47.7   RDW 12.3 - 15.4 % 13.1   MCV 79.0 - 97.0 fL 81.3   MCH 26.6 - 33.0 pg 27.9   MCHC 31.5 - 35.7 g/dL 34.4   MPV 6.0 - 12.0 fL 8.8   Platelets 140 - 450 10*3/mm3 145   RDW-SD 37.0 - 54.0 fl 38.0   Neutrophil Rel % 42.7 - 76.0 % 78.5 (H)   Lymphocyte Rel % 19.6 - 45.3 % 12.4 (L)   Monocyte Rel % 5.0 - 12.0 % 6.4   Eosinophil Rel % 0.3 - 6.2 % 1.5   Basophil Rel % 0.0 - 1.5 % 0.8   Immature Granulocyte Rel % 0.0 - 0.5 % 0.4    Neutrophils Absolute 1.70 - 7.00 10*3/mm3 7.18 (H)   Lymphocytes Absolute 0.70 - 3.10 10*3/mm3 1.14   Monocytes Absolute 0.10 - 0.90 10*3/mm3 0.59   Eosinophils Absolute 0.00 - 0.40 10*3/mm3 0.14   Basophils Absolute 0.00 - 0.20 10*3/mm3 0.07   Immature Grans, Absolute 0.00 - 0.05 10*3/mm3 0.04   nRBC 0.0 - 0.2 /100 WBC 0.0   (H): Data is abnormally high  (L): Data is abnormally low    Assessment & Plan   Assessment / Plan     Active Hospital Problems    Diagnosis  POA   • **Acute cholecystitis [K81.0]  Yes     Priority: High   • Essential hypertension [I10]  Yes     Priority: Medium   • Hyperlipidemia [E78.5]  Yes     Priority: Medium   • Type 2 diabetes mellitus (HCC) [E11.9]  Yes     Priority: Medium   • DVT (deep venous thrombosis) (HCC) [I82.409]  Yes     Priority: Medium   • Hyponatremia [E87.1]  Yes     Priority: Low         Plan:  Anibal Redding is a 62 y.o. male with a history of HTN, HLD, T2DM, and DVT (currently on Eliquis) who presents to [facility] ED for complaint of epigastric abdominal pain that began yesterday evening.     Acute Cholecystitis  -Patient currently stable and in no acute distress. VSS on room air.  -CT abd/pelvis shows edema surrounds a distended, thick-walled, stone containing gallbladder. Findings are concerning for acute calculus cholecystitis. As well as s/p sigmoid colectomy.   -US gallbladder pending  -Gen Surgery consult for the am. Possible cholecystectomy in the am  -NPO for tonight pending surgical evaluation  -Received Zosyn in the ED. Will continue for tonight.  -Hold Eliquis for tonight  -IV fluids  -Pain control  -Zofran for nausea    DVT  -Dx on 5/4/2022. Has been on Eliquis daily.   -CTA chest negative for PE.  -Hold for tonight pending surgical consult in the am    Hypertension  Hyperlipidemia  -Continue home Losartan and Metoprolol  -Continue statin  -Lipid panel in the am    Type 2 Diabetes Mellitus  -Blood glucose 271  -Check A1C  -Fingerstick achs.  SSI  -Repeat bmp in the am  -Diabetes Educator to see in the am given glucose >200    Hyponatremia  -Na+ 132  -Received 1L NS bolus in the ED  -Repeat bmp in the am        CODE STATUS: Full Code  Code Status and Medical Interventions:   Ordered at: 07/01/22 0253     Code Status (Patient has no pulse and is not breathing):    CPR (Attempt to Resuscitate)     Medical Interventions (Patient has pulse or is breathing):    Full Support       Discharge Blueprint (criteria for discharge readiness):   1. Patient pain free on home medications  2. Patient evaluated by surgical team and cleared for discharge    Seamus-Ryan Thompson PA-C  07/01/22

## 2022-07-01 NOTE — CONSULTS
Diabetes Education    Patient Name:  Anibal Redding  YOB: 1960  MRN: [id number]  Admit Date:  6/30/2022      Diabetes education consult noted; pt in OR. We will cont to follow.      Electronically signed by:  Genoveva Bruce RN  07/01/22 13:22 EDT

## 2022-07-01 NOTE — ED PROVIDER NOTES
EMERGENCY DEPARTMENT ENCOUNTER    Pt Name: Anibal Redding  MRN: [id number]  Pt :   1960  Room Number:  S213/1  Date of encounter:  2022  PCP: Andrew Powell MD  ED Provider: Fabian Rios MD    Historian: Patient      HPI:  Chief Complaint: Abdominal pain        Context: Anibal Redding is a 62-year-old man who presents with complaint of abdominal pain.  He was recently diagnosed about a month ago with DVT in his right leg following an orthopedic injury.  He has been taking Eliquis and was told if he had any chest pain he should return to the emergency department.  He also has history of diverticulitis and partial colectomy.  Yesterday evening he began experiencing moderate epigastric pain that he describes as pressure and has been progressively worsening over the last 24 hours.  He has had associated belching.  His last bowel movement was yesterday and was normal.  He denies lower abdominal pain or actual chest pain only upper abdomen in the midline.  Has had the belching but no nausea or vomiting.  Currently rates his pain as severe.  Denies fevers or systemic symptoms.  No other complaints at this time.     PAST MEDICAL HISTORY  Past Medical History:   Diagnosis Date   • Arthritis    • Blood clot in vein     Pt states times five   • Diabetes mellitus (HCC)    • Diverticulitis    • Hypertension          PAST SURGICAL HISTORY  Past Surgical History:   Procedure Laterality Date   • APPENDECTOMY      Pt states ruptured appendix   • COLON SURGERY      Colon Resection   • TONSILLECTOMY           FAMILY HISTORY  Family History   Problem Relation Age of Onset   • Hyperlipidemia Mother    • Hypertension Mother    • COPD Father    • Heart disease Brother          SOCIAL HISTORY  Social History     Socioeconomic History   • Marital status:    Tobacco Use   • Smoking status: Never Smoker   • Smokeless tobacco: Never Used   Vaping Use   • Vaping Use: Never used   Substance and  Sexual Activity   • Alcohol use: Yes     Comment: once a month   • Drug use: Never   • Sexual activity: Defer         ALLERGIES  Cefaclor, Amlodipine, and Benazepril        REVIEW OF SYSTEMS  Review of Systems       All systems reviewed and negative except for those discussed in HPI.       PHYSICAL EXAM    I have reviewed the triage vital signs and nursing notes.    ED Triage Vitals   Temp Heart Rate Resp BP SpO2   06/30/22 1645 06/30/22 1645 06/30/22 1645 06/30/22 1645 06/30/22 1645   98 °F (36.7 °C) 73 18 160/95 95 %      Temp src Heart Rate Source Patient Position BP Location FiO2 (%)   07/01/22 0404 06/30/22 1645 06/30/22 1645 06/30/22 1645 --   Oral Monitor Sitting Left arm        Physical Exam  GENERAL:   Appears uncomfortable  HENT: Nares patent.  EYES: No scleral icterus.  CV: Regular rhythm, regular rate.  RESPIRATORY: Normal effort.  No audible wheezes, rales or rhonchi.  ABDOMEN: Distended, hyperactive bowel sounds, diffuse tenderness to palpation worse in the epigastrium no rigidity or guarding  MUSCULOSKELETAL: No deformities.   NEURO: Alert, moves all extremities, follows commands.  SKIN: Warm, dry, no rash visualized.        LAB RESULTS  Recent Results (from the past 24 hour(s))   COVID-19 and FLU A/B PCR - Swab, Nasopharynx    Collection Time: 07/01/22  2:31 AM    Specimen: Nasopharynx; Swab   Result Value Ref Range    COVID19 Not Detected Not Detected - Ref. Range    Influenza A PCR Not Detected Not Detected    Influenza B PCR Not Detected Not Detected   Lactic Acid, Plasma    Collection Time: 07/01/22  2:36 AM    Specimen: Blood   Result Value Ref Range    Lactate 3.6 (C) 0.5 - 2.0 mmol/L   POC Glucose Once    Collection Time: 07/01/22  7:28 AM    Specimen: Blood   Result Value Ref Range    Glucose 191 (H) 70 - 130 mg/dL   Hemoglobin A1c    Collection Time: 07/01/22  9:23 AM    Specimen: Blood   Result Value Ref Range    Hemoglobin A1C 8.90 (H) 4.80 - 5.60 %   STAT Lactic Acid, Reflex    Collection  Time: 07/01/22  9:23 AM    Specimen: Blood   Result Value Ref Range    Lactate 1.2 0.5 - 2.0 mmol/L   Basic Metabolic Panel    Collection Time: 07/01/22  9:23 AM    Specimen: Blood   Result Value Ref Range    Glucose 181 (H) 65 - 99 mg/dL    BUN 11 8 - 23 mg/dL    Creatinine 0.98 0.76 - 1.27 mg/dL    Sodium 135 (L) 136 - 145 mmol/L    Potassium 3.6 3.5 - 5.2 mmol/L    Chloride 101 98 - 107 mmol/L    CO2 25.0 22.0 - 29.0 mmol/L    Calcium 8.3 (L) 8.6 - 10.5 mg/dL    BUN/Creatinine Ratio 11.2 7.0 - 25.0    Anion Gap 9.0 5.0 - 15.0 mmol/L    eGFR 86.6 >60.0 mL/min/1.73   Lipid Panel    Collection Time: 07/01/22  9:23 AM    Specimen: Blood   Result Value Ref Range    Total Cholesterol 202 (H) 0 - 200 mg/dL    Triglycerides 533 (H) 0 - 150 mg/dL    HDL Cholesterol 25 (L) 40 - 60 mg/dL    LDL Cholesterol  89 0 - 100 mg/dL    VLDL Cholesterol 88 (H) 5 - 40 mg/dL    LDL/HDL Ratio 2.82    CBC Auto Differential    Collection Time: 07/01/22  9:23 AM    Specimen: Blood   Result Value Ref Range    WBC 8.58 3.40 - 10.80 10*3/mm3    RBC 5.24 4.14 - 5.80 10*6/mm3    Hemoglobin 14.5 13.0 - 17.7 g/dL    Hematocrit 42.8 37.5 - 51.0 %    MCV 81.7 79.0 - 97.0 fL    MCH 27.7 26.6 - 33.0 pg    MCHC 33.9 31.5 - 35.7 g/dL    RDW 13.3 12.3 - 15.4 %    RDW-SD 39.3 37.0 - 54.0 fl    MPV 9.1 6.0 - 12.0 fL    Platelets 130 (L) 140 - 450 10*3/mm3    Neutrophil % 80.9 (H) 42.7 - 76.0 %    Lymphocyte % 9.1 (L) 19.6 - 45.3 %    Monocyte % 8.0 5.0 - 12.0 %    Eosinophil % 1.4 0.3 - 6.2 %    Basophil % 0.3 0.0 - 1.5 %    Immature Grans % 0.3 0.0 - 0.5 %    Neutrophils, Absolute 6.93 1.70 - 7.00 10*3/mm3    Lymphocytes, Absolute 0.78 0.70 - 3.10 10*3/mm3    Monocytes, Absolute 0.69 0.10 - 0.90 10*3/mm3    Eosinophils, Absolute 0.12 0.00 - 0.40 10*3/mm3    Basophils, Absolute 0.03 0.00 - 0.20 10*3/mm3    Immature Grans, Absolute 0.03 0.00 - 0.05 10*3/mm3    nRBC 0.0 0.0 - 0.2 /100 WBC   POC Glucose Once    Collection Time: 07/01/22 11:26 AM    Specimen:  Blood   Result Value Ref Range    Glucose 177 (H) 70 - 130 mg/dL   POC Glucose Once    Collection Time: 07/01/22 12:33 PM    Specimen: Blood   Result Value Ref Range    Glucose 156 (H) 70 - 130 mg/dL   ECG 12 Lead    Collection Time: 07/01/22 12:36 PM   Result Value Ref Range    QT Interval 400 ms    QTC Interval 452 ms   POC Glucose Once    Collection Time: 07/01/22  3:40 PM    Specimen: Blood   Result Value Ref Range    Glucose 191 (H) 70 - 130 mg/dL   POC Glucose Once    Collection Time: 07/01/22  4:45 PM    Specimen: Blood   Result Value Ref Range    Glucose 191 (H) 70 - 130 mg/dL   POC Glucose Once    Collection Time: 07/01/22  8:10 PM    Specimen: Blood   Result Value Ref Range    Glucose 354 (H) 70 - 130 mg/dL       If labs were ordered, I independently reviewed the results.        RADIOLOGY  CT Abdomen Pelvis With Contrast    Result Date: 6/30/2022  EXAM: CT OF THE ABDOMEN AND PELVIS WITH IV CONTRAST INDICATIONS: Epigastric pain and abdominal distention TECHNIQUE: CT scan of the abdomen and pelvis was performed following the administration of 100 mL Isovue-370 intravenous contrast. CT dose lowering techniques were used, to include: automated exposure control, adjustment for patient size, and / or use of  iterative reconstruction. COMPARISON: No prior abdominal or pelvic CTs are in the system. FINDINGS: Bones: No destructive osseous lesions. Lung bases: Unremarkable ABDOMEN: Liver: The liver is normal in contour without focal lesion. The portal venous system is patent. Gallbladder and Bile Ducts: Edema surrounds a distended, thick-walled, stone containing gallbladder. There is no intrahepatic or extrahepatic biliary ductal dilatation. Spleen: There is homogeneous enhancement without focal lesion. Pancreas: The pancreatic parenchyma is unremarkable. There is no pancreatic ductal dilatation. Adrenals: Unremarkable without nodularity. Kidneys: There is symmetric enhancement without hydronephrosis. A  hypoattenuating structure in the right kidney is too small to characterize. Vasculature: Minimal atherosclerotic calcifications are present. There is no abdominal aortic aneurysm. Incidentally noted is a retroaortic left renal vein. Nodes: There is no lymphadenopathy by size criteria. Bowel: There is no bowel obstruction. Status post sigmoid colectomy. Scattered colonic diverticula are present without evidence of acute diverticulitis. The appendix is not identified, however there are no inflammatory changes at the cecal base. Mesentery/Peritoneum: Fat-containing periumbilical ventral hernias are present. Soft Tissues: Unremarkable PELVIS: Pelvic Organs: There is no abnormal pelvic mass. The urinary bladder is unremarkable.     1. Edema surrounds a distended, thick-walled, stone containing gallbladder. Findings are concerning for acute calculus cholecystitis. 2. Scattered colonic diverticula. 3. Fat-containing periumbilical ventral hernias. 4. Status post sigmoid colectomy. Question prior appendectomy as well. Electronically signed by:  Peterson Staples M.D.  6/30/2022 9:29 PM Mountain Time    [place] Cholangiogram Operative    Result Date: 7/1/2022  DATE OF EXAM: 7/1/2022 2:04 PM  PROCEDURE: [place] CHOLANGIOGRAM OPERATIVE-  INDICATIONS: IOC; K81.0-Acute cholecystitis; E87.2-Acidosis; E11.65-Type 2 diabetes mellitus with hyperglycemia; R10.9-Unspecified abdominal pain  COMPARISON: No comparisons available.  TECHNIQUE: Digital spot images were obtained from an intraoperative cholangiogram procedure performed by the surgeon.      FINDINGS/IMPRESSION: 10 seconds of fluoroscopy time provided with 2 images saved during intraoperative cholangiogram, demonstrating no focal filling defect concerning for biliary stone.  This report was finalized on 7/1/2022 2:41 PM by Dimitri Chavarria.      CT Angiogram Chest    Result Date: 6/30/2022  CTA CHEST WITH CONTRAST CLINICAL INDICATION: Chest pain, currently being treated for DVT COMPARISON:  None. TECHNIQUE: 100 mL Isovue-370 administered intravenously. CTA images of the chest were obtained. Three-dimensional volume reconstructions were generated. CT dose lowering techniques were used, to include: automated exposure control, adjustment for patient  size, and or use of iterative reconstruction. FINDINGS: CTA: No pulmonary arterial filling defect. Heart size normal without pericardial effusion. Mediastinal vessels normal in caliber and patency. Mediastinum: No mediastinal or hilar lymphadenopathy. Trachea and central airways are patent. Thyroid is normal. Esophagus is normal. Lungs and pleura: Lungs are clear without focal consolidation, pleural effusion or pneumothorax. Upper abdomen: Calcified stone in the region of the gallbladder neck. Remainder the gallbladder is partially visualized. Body wall: No acute abnormality. Bones: No acute osseous abnormality.     1.  No CT evidence of acute pulmonary embolus. No acute cardiopulmonary abnormality. 2.  Gallstone in the region of the gallbladder neck. Remainder of the gallbladder is not visualized. This could be further evaluated with dedicated ultrasound if clinically warranted. Electronically signed by:  Natalya Romo  6/30/2022 9:44 PM Mountain Time      I ordered and reviewed the above noted radiographic studies.      I viewed images of CTA chest which does not reveal any pneumonia, embolism, aortic injury or other abnormalities that I can appreciate.  CT scan of the abdomen pelvis which reveals distended gallbladder with gallbladder wall thickening and surrounding stranding suggesting acute cholecystitis.    See radiologist's dictation for official interpretation.        PROCEDURES    Procedures    ECG 12 Lead   Final Result   Test Reason : surgery, hypertension   Blood Pressure :   */*   mmHG   Vent. Rate :  77 BPM     Atrial Rate :  77 BPM      P-R Int : 200 ms          QRS Dur : 104 ms       QT Int : 400 ms       P-R-T Axes :  45 -21  -5 degrees      QTc  Int : 452 ms      Normal sinus rhythm   No previous ECGs available   Confirmed by JIM MONREAL ([id number]) on 7/1/2022 12:46:17 PM      Referred By:            Confirmed By: JIM MONREAL          MEDICATIONS GIVEN IN ER    Medications   Sodium Chloride (PF) 0.9 % 10 mL ( Intravenous MAR Unhold 7/1/22 1630)   fenofibrate (TRICOR) tablet 145 mg (145 mg Oral Given 7/1/22 0813)   losartan (COZAAR) tablet 50 mg (50 mg Oral Given 7/1/22 2010)   metoprolol tartrate (LOPRESSOR) tablet 100 mg (100 mg Oral Given 7/1/22 2010)   tamsulosin (FLOMAX) 24 hr capsule 0.4 mg (0.4 mg Oral Given 7/1/22 0814)   sodium chloride 0.9 % flush 10 mL ( Intravenous Canceled Entry 7/1/22 2015)   sodium chloride 0.9 % flush 10 mL (10 mL Intravenous Given 7/1/22 1243)   sodium chloride 0.9 % infusion (0 mL/hr Intravenous Stopped 7/1/22 1756)   acetaminophen (TYLENOL) tablet 650 mg (has no administration in time range)   melatonin tablet 5 mg (has no administration in time range)   ondansetron (ZOFRAN) tablet 4 mg (has no administration in time range)     Or   ondansetron (ZOFRAN) injection 4 mg (has no administration in time range)   dextrose (GLUTOSE) oral gel 15 g ( Oral MAR Unhold 7/1/22 1630)   dextrose (D50W) (25 g/50 mL) IV injection 25 g ( Intravenous MAR Unhold 7/1/22 1630)   glucagon (human recombinant) (GLUCAGEN DIAGNOSTIC) injection 1 mg ( Intramuscular MAR Unhold 7/1/22 1630)   Insulin Lispro (humaLOG) injection 0-9 Units (2 Units Subcutaneous Given 7/1/22 1802)   piperacillin-tazobactam (ZOSYN) 3.375 g in iso-osmotic dextrose 50 ml (premix) (3.375 g Intravenous New Bag 7/1/22 1807)   lactated ringers infusion (9 mL/hr Intravenous Currently Infusing 7/1/22 1900)   oxyCODONE-acetaminophen (PERCOCET) 5-325 MG per tablet 2 tablet (2 tablets Oral Given 7/1/22 1639)   docusate sodium (COLACE) capsule 100 mg (100 mg Oral Given 7/1/22 2010)   HYDROmorphone (DILAUDID) injection 0.2 mg (has no administration in time range)   simethicone (MYLICON)  chewable tablet 80 mg (has no administration in time range)   bisacodyl (DULCOLAX) EC tablet 10 mg (10 mg Oral Given 7/1/22 1807)   lactated ringers bolus 1,000 mL (0 mL Intravenous Stopped 7/1/22 0102)   ondansetron (ZOFRAN) injection 4 mg (4 mg Intravenous Given 6/30/22 2347)   ketorolac (TORADOL) injection 15 mg (15 mg Intravenous Given 6/30/22 2347)   iopamidol (ISOVUE-370) 76 % injection 100 mL (100 mL Intravenous Given 6/30/22 2256)   piperacillin-tazobactam (ZOSYN) 3.375 g in iso-osmotic dextrose 50 ml (premix) (0 g Intravenous Stopped 7/1/22 0700)   diphenhydrAMINE (BENADRYL) injection 25 mg (25 mg Intravenous Given 7/1/22 0341)   sodium chloride 0.9 % bolus 1,000 mL (0 mL Intravenous Stopped 7/1/22 1100)   famotidine (PEPCID) tablet 20 mg (20 mg Oral Given 7/1/22 1243)         PROGRESS, DATA ANALYSIS, CONSULTS, AND MEDICAL DECISION MAKING    All labs have been independently reviewed by me.  All radiology studies have been reviewed by me and the radiologist dictating the report.   EKG's have been independently viewed and interpreted by me.            ED Course as of 07/01/22 2030   Thu Jun 30, 2022   2956 In summary this a very nice 62-year-old man who presents with complaint of abdominal pain.  He was recently diagnosed about a month ago with DVT in his right leg following an orthopedic injury.  He has been taking Eliquis and was told if he had any chest pain he should return to the emergency department.  He also has history of diverticulitis and partial colectomy.  Yesterday evening he began experiencing moderate epigastric pain that he describes as pressure and has been progressively worsening over the last 24 hours.  He has had associated belching.  His last bowel movement was yesterday and was normal.  He denies lower abdominal pain or actual chest pain only upper abdomen in the midline.  Has had the belching but no nausea or vomiting.  Currently rates his pain as severe.  Denies fevers or systemic  symptoms.  No other complaints at this time. [CC]      ED Course User Index  [CC] Fabian Rios MD       He arrived ill and uncomfortable appearing but vitals are within normal limits.  Started on IV fluids, Zofran, Toradol.  He has significant hyperglycemia 271 but otherwise CBC and CMP reassuring and nonactionable.  IV fluids are already going.  COVID and flu swab is negative.  He does have significantly elevated lactate at 3.6.  Started on Zosyn.  CTA of the chest does not reveal any acute abnormalities and CT abdomen pelvis concerning for acute cholecystitis with gallbladder wall thickening and stranding.  Already on fluids and Zosyn.  Formal right upper quadrant ultrasound ordered.  Medicine team consulted for admission.      AS OF 20:30 EDT VITALS:    BP - 129/71  HR - 90  TEMP - 97.8 °F (36.6 °C) (Oral)  O2 SATS - 91%                  DIAGNOSIS  Final diagnoses:   Acute cholecystitis   Lactic acidosis   Hyperglycemia due to diabetes mellitus (HCC)         DISPOSITION  Admit             Fabian Rios MD  07/01/22 2033

## 2022-07-01 NOTE — ANESTHESIA POSTPROCEDURE EVALUATION
Patient: Anibal Redding    Procedure Summary     Date: 07/01/22 Room / Location:  [facility] 04 /  [facility]    Anesthesia Start: 1335 Anesthesia Stop:     Procedure: CHOLECYSTECTOMY LAPAROSCOPIC INTRAOPERATIVE CHOLANGIOGRAM (N/A Abdomen) Diagnosis:     Surgeons: Irvin Griffin MD Provider: Ochoa Dumont MD    Anesthesia Type: general ASA Status: 3          Anesthesia Type: general    Vitals  Vitals Value Taken Time   /59 07/01/22 1510   Temp     Pulse 73 07/01/22 1512   Resp     SpO2 95 % 07/01/22 1512   Vitals shown include unvalidated device data.        Post Anesthesia Care and Evaluation    Patient location during evaluation: PACU  Patient participation: complete - patient participated  Level of consciousness: responsive to noxious stimuli  Pain management: adequate    Airway patency: patent  Anesthetic complications: No anesthetic complications  PONV Status: none  Cardiovascular status: hemodynamically stable and acceptable  Respiratory status: nonlabored ventilation, acceptable and nasal cannula  Hydration status: acceptable

## 2022-07-01 NOTE — PROGRESS NOTES
[facility] Medicine Services  ADMISSION FOLLOW-UP NOTE          Patient admitted after midnight, H&P by my partner performed earlier on today's date reviewed.  Interim findings, labs, and charting also reviewed.        The [facility] Hospital Problem List has been managed and updated to include any new diagnoses:  Active Hospital Problems    Diagnosis  POA   • **Acute cholecystitis [K81.0]  Yes   • Essential hypertension [I10]  Yes   • Hyperlipidemia [E78.5]  Yes   • Type 2 diabetes mellitus (HCC) [E11.9]  Yes   • Hyponatremia [E87.1]  Yes   • DVT (deep venous thrombosis) (HCC) [I82.409]  Yes      Resolved Hospital Problems   No resolved problems to display.         ADDITIONAL PLAN:  - detailed assessment and plan from admission reviewed  - Patient seen and examined. Having RUQ pain.   - To OR today with Dr Elias arenas [facility]   - AM labs     [place],   07/01/22

## 2022-07-01 NOTE — CONSULTS
Patient Name:  Anibal Redding  YOB: 1960  [id number]       Patient Care Team:  Andrew Powell MD as PCP - General (Family Medicine)      General Surgery Consult Note     Date of Consultation: 07/01/22    Consulting Physician : La Morgan DO    Reason for Consult : Abdominal pain    Subjective     I have been asked to see  Anibal Redding , a 62 y.o. male in consultation for abdominal pain.  He has a known past medical history of hypertension, DVT on chronic Eliquis, and diabetes who presented to our hospital last night with a 1 day history of abdominal pain.  He describes the pain as epigastric and sharp and severe.  It began at 3 AM Thursday morning.  It was associated with no nausea or vomiting.  He denies any fevers or chills.  His last dose of Eliquis was yesterday morning.  He reports no history of similar pain in the past, though he has had a history of diverticulitis status postresection in the past.  He presented to our emergency department, and subsequent evaluation concerning for acute cholecystitis.  He was admitted by the hospitalist, we have been asked to see him for possible surgical management.  Currently his pain is somewhat better controlled.  He has no other current complaints.      Allergy:   Allergies   Allergen Reactions   • Cefaclor Hives   • Amlodipine Other (See Comments)   • Benazepril Other (See Comments)     Rash on arms, neck       Medications:  fenofibrate, 145 mg, Oral, Daily  insulin lispro, 0-9 Units, Subcutaneous, TID AC  losartan, 50 mg, Oral, BID  metoprolol tartrate, 100 mg, Oral, BID  piperacillin-tazobactam, 3.375 g, Intravenous, Q8H  sodium chloride, 1,000 mL, Intravenous, Once  sodium chloride, 10 mL, Intravenous, Q12H  tamsulosin, 0.4 mg, Oral, Daily      sodium chloride, 75 mL/hr, Last Rate: 75 mL/hr (07/01/22 0426)      No current facility-administered medications on file prior to encounter.     Current Outpatient Medications on  File Prior to Encounter   Medication Sig   • apixaban (ELIQUIS) 5 MG tablet tablet Take 5 mg by mouth Every 12 (Twelve) Hours.   • empagliflozin (JARDIANCE) 25 MG tablet tablet Take 25 mg by mouth Daily.   • Januvia 100 MG tablet    • losartan (COZAAR) 50 MG tablet Take 1 tablet by mouth 2 (Two) Times a Day.   • metoprolol tartrate (LOPRESSOR) 100 MG tablet Take 1 tablet by mouth 2 (Two) Times a Day.   • [DISCONTINUED] Apixaban Starter Pack tablet therapy pack Take two 5 mg tablets by mouth every 12 hours for 7 days. Followed by one 5 mg tablet every 12 hours. (Dispense starter pack if available) (Patient taking differently: Take two 5 mg tablets by mouth every 12 hours for 7 days. Followed by one 5 mg tablet every 12 hours. (Dispense starter pack if available))   • metFORMIN (GLUCOPHAGE) 1000 MG tablet 500 mg 2 (Two) Times a Day With Meals.   • [DISCONTINUED] Farxiga 10 MG tablet    • [DISCONTINUED] fenofibrate (TRICOR) 145 MG tablet Take 145 mg by mouth Daily.   • [DISCONTINUED] fenofibrate (TRICOR) 145 MG tablet    • [DISCONTINUED] Jardiance 25 MG tablet tablet    • [DISCONTINUED] losartan (COZAAR) 50 MG tablet    • [DISCONTINUED] metoprolol tartrate (LOPRESSOR) 100 MG tablet    • [DISCONTINUED] rosuvastatin (CRESTOR) 10 MG tablet    • [DISCONTINUED] Rybelsus 7 MG tablet    • [DISCONTINUED] Semaglutide (Rybelsus) 7 MG tablet Take 7 mg by mouth Daily.   • [DISCONTINUED] tamsulosin (FLOMAX) 0.4 MG capsule 24 hr capsule Take 0.4 mg by mouth.   • [DISCONTINUED] tamsulosin (FLOMAX) 0.4 MG capsule 24 hr capsule    • [DISCONTINUED] traMADol (ULTRAM) 50 MG tablet Take 1 tablet by mouth Every 6 (Six) Hours As Needed for Moderate Pain .       PMHx:   Past Medical History:   Diagnosis Date   • Arthritis    • Diabetes mellitus (HCC)    • Diverticulitis    • Hypertension    -History of DVT    Past Surgical History:  Past Surgical History:   Procedure Laterality Date   • COLON SURGERY    "  -Appendectomy  -Tonsillectomy      Family History: Noncontributory     Social History: Pt lives in [place].    Tobacco use: Denies     EtOH use : Rare   Illicit drug use: Denies      Review of Systems        Constitutional: No fevers, chills or malaise   Eyes: Denies visual changes    Cardiovascular: Denies chest pain, palpitations   Pulmonary: Denies cough or shortness of breath   Abdominal/ GI: See HPI    Genitourinary: Denies dysuria or hematuria   Musculoskeletal: Denies any but chronic joint aches, pains or deformities   Psychiatric: No recent mood changes   Neurologic: No paresthesias or loss of function          Objective     Physical Exam:      Vital Signs  /85 (BP Location: Right arm, Patient Position: Lying)   Pulse 80   Temp 99.3 °F (37.4 °C) (Oral)   Resp 16   Ht 182.9 cm (72\")   Wt 83.9 kg (185 lb)   SpO2 93%   BMI 25.09 kg/m²     Intake/Output Summary (Last 24 hours) at 7/1/2022 0737  Last data filed at 7/1/2022 0102  Gross per 24 hour   Intake 1000 ml   Output --   Net 1000 ml         Physical Exam:    Head: Normocephalic, atraumatic.   Eyes: Pupils equal, round, react to light and accommodation.   Mouth: Oral mucosa without lesions,   Neck: No masses, lymphadenopathy or carotid bruits bilaterally   CV: Rhythm  and rate regular , no  murmurs, rubs or gallops  Lungs: Clear  to auscultation bilaterally   Abdomen: Bowel sounds positive  , soft, tender RUQ  Groin : No obvious hernias bilaterally   Extremities:  No cyanosis, clubbing or edema bilaterally   Lymphatics: No abnormal lymphadenopathy appreciated   Neurologic: No gross deficits       Results Review: I have personally reviewed all of the recent lab and imaging results available at this time.  Laboratory exam reveals white count on presentation of 9000 with a slight left shift.  Hemoglobin is 16.4 platelet count 145.  Comprehensive metabolic panel essentially normal other than a glucose of 271.  Lactate at that time was 1.8 but has " risen to 3.6.     CT scan of the abdomen pelvis was personally reviewed by me as well as a final dictated report.  This demonstrates evidence of acute cholecystitis with gallbladder wall thickening and multiple gallstones.  There is perhaps a small amount of pericholecystic fluid.  There is no evidence of bowel obstruction.  He has surgical changes consistent with prior sigmoid colectomy and appendectomy as expected.  He has perhaps mild splenomegaly, and some small fat-containing hernias near the umbilicus, each less than 1 cm.         Assessment and Plan:    Acute cholecystitis - He has evidence of acute cholecystitis.  I think the best course of action would be laparoscopic cholecystectomy with intraoperative cholangiography.  I have discussed the risks and benefits at length with the patient, who agrees to proceed.  It has been over 24 hours since his last dose of Eliquis which should be adequate.  We will proceed later today.  We do not require further imaging of his gallbladder to establish the diagnosis, so I have canceled the ultrasound.       Active Hospital Problems    Diagnosis  POA   • **Acute cholecystitis [K81.0]  Yes   • Essential hypertension [I10]  Yes   • Hyperlipidemia [E78.5]  Yes   • Type 2 diabetes mellitus (HCC) [E11.9]  Yes   • Hyponatremia [E87.1]  Yes   • DVT (deep venous thrombosis) (HCC) [I82.409]  Yes      Resolved Hospital Problems   No resolved problems to display.            I discussed the patient's findings and my recommendations with the patient and/or family, as well as the primary team     Irvin Griffin MD  07/01/22  07:37 EDT

## 2022-07-01 NOTE — CASE MANAGEMENT/SOCIAL WORK
Discharge Planning Assessment  [facility]     Patient Name: Anibal Redding  MRN: [id number]  Today's Date: 7/1/2022    Admit Date: 6/30/2022     Discharge Needs Assessment     Row Name 07/01/22 1333       Living Environment    People in Home spouse  pt resides in [facility]    Name(s) of People in Home abdi Valencia    Current Living Arrangements home    Primary Care Provided by self    Provides Primary Care For no one    Family Caregiver if Needed spouse    Family Caregiver Names abdi Valencia    Quality of Family Relationships helpful;involved;supportive    Able to Return to Prior Arrangements yes       Resource/Environmental Concerns    Resource/Environmental Concerns none    Transportation Concerns none       Transition Planning    Patient/Family Anticipates Transition to home with family    Patient/Family Anticipated Services at Transition none    Transportation Anticipated family or friend will provide       Discharge Needs Assessment    Readmission Within the Last 30 Days no previous admission in last 30 days    Equipment Currently Used at Home glucometer;bp cuff    Concerns to be Addressed denies needs/concerns at this time    Anticipated Changes Related to Illness none    Equipment Needed After Discharge none    Provided Post Acute Provider List? N/A    Provided Post Acute Provider Quality & Resource List? N/A               Discharge Plan     Row Name 07/01/22 2951       Plan    Plan home    Patient/Family in Agreement with Plan yes    Plan Comments CM spoke with pt at bedside. Pt resides in [facility] with his wife Annie and is independent of adls. Pt works full time in a factory and also "[employer]. Pt has a blood glucose monitor and jorge cuff at home. Pt is not current with home health or outpatient medical services at discharge. Pt reports he has a living will and POA (son Clarence Redding) however documents are not on file. Pt has received 2 covid vaccinations.Pt confirms he has [employer] insurance, denies concerns or disruption in coverage. Pt has prescription drug coverage and denies issues obtaining or affording current medications. Pt plans to return home with assistance from his wife as needed. Pt denies discharge needs at this time and CM will continue to follow. Pt will have private transportation home at discharge.    Final Discharge Disposition Code 01 - home or self-care              Continued Care and Services - Admitted Since 6/30/2022    Coordination has not been started for this encounter.          Demographic Summary     Row Name 07/01/22 4521       General Information    Admission Type observation    Referral Source admission list    Reason for Consult discharge planning    Preferred Language English    General Information Comments PCP- Andrew Powell       Contact Information    Permission Granted to Share Info With                Functional Status     Row Name 07/01/22 7652       Functional Status    Usual Activity Tolerance good    Current Activity Tolerance moderate       Functional Status, IADL    Medications independent    Meal Preparation independent    Housekeeping independent    Laundry independent    Shopping independent       Mental Status    General Appearance WDL WDL       Mental Status Summary    Recent Changes in Mental Status/Cognitive Functioning no changes       Employment/    Employment Status employed full-time    Current or Previous Occupation factory work    Employment/ Comments Pt confirms he has [employer] insurance, denies concerns or disruption in coverage. Pt has prescription drug coverage and denies issues obtaining or affording current medications.               Psychosocial    No documentation.                Abuse/Neglect    No documentation.                Legal    No documentation.                Substance Abuse    No documentation.                Patient Forms    No documentation.                   Margaret  REGINA Snider RN

## 2022-07-01 NOTE — PROGRESS NOTES
Pharmacy consult to dose Zosyn for Anibal Redding    Estimated Creatinine Clearance: 78.7 mL/min (by C-G formula based on SCr of 1.14 mg/dL).  83.9 kg (185 lb)    Plan: Zosyn 3.375 Gm IV Q8H (extended infusion protocol)  Diagnosis: Intra-abdominal coverage      Salty Iniguez [facility]  7/1/2022  03:29 EDT

## 2022-07-01 NOTE — PROGRESS NOTES
"Patient Name:  Anibal Redding  YOB: 1960  [id number]    Surgery Post - Operative Note    Date of visit: 7/1/2022    Subjective   Subjective: Feels sore, pain controlled.       Objective     Objective:    /76   Pulse 85   Temp 99.4 °F (37.4 °C)   Resp 14   Ht 182.9 cm (72\")   Wt 83.9 kg (185 lb)   SpO2 95%   BMI 25.09 kg/m²     CV:  Rate  regular and rhythm  regular  L:  Clear  to auscultation bilaterally   ABD:  Soft, appropriately tender. Dressings  clean, dry and intact   EXT:  No cyanosis, clubbing or edema             Assessment/ Plan: Doing well after Lap CCY. Continue Pulmonary toilet    Problem List Items Addressed This Visit        Gastrointestinal Abdominal     * (Principal) Acute cholecystitis - Primary      Other Visit Diagnoses     Lactic acidosis        Hyperglycemia due to diabetes mellitus (HCC)        Abdominal pain        Relevant Orders    Tissue Pathology Exam           Active Hospital Problems    Diagnosis  POA   • **Acute cholecystitis [K81.0]  Yes   • Essential hypertension [I10]  Yes   • Hyperlipidemia [E78.5]  Yes   • Type 2 diabetes mellitus (HCC) [E11.9]  Yes   • Hyponatremia [E87.1]  Yes   • DVT (deep venous thrombosis) (HCC) [I82.409]  Yes      Resolved Hospital Problems   No resolved problems to display.            Irvin Griffin MD  7/1/2022  16:28 EDT    "

## 2022-07-01 NOTE — ANESTHESIA PREPROCEDURE EVALUATION
Anesthesia Evaluation     Patient summary reviewed and Nursing notes reviewed   NPO Solid Status: > 8 hours  NPO Liquid Status: > 8 hours           Airway   Mallampati: II  TM distance: >3 FB  Neck ROM: full  No difficulty expected  Dental          Pulmonary     breath sounds clear to auscultation  Cardiovascular     ECG reviewed  Rhythm: regular  Rate: normal    (+) hypertension, hyperlipidemia,       Neuro/Psych  GI/Hepatic/Renal/Endo    (+)   diabetes mellitus,     Musculoskeletal     Abdominal    Substance History      OB/GYN          Other   arthritis,          Phys Exam Other: Extensive periodontal disease and carious teeth                Anesthesia Plan    ASA 3     general     intravenous induction     Anesthetic plan, risks, benefits, and alternatives have been provided, discussed and informed consent has been obtained with: patient.    Plan discussed with CRNA.        CODE STATUS:    Code Status (Patient has no pulse and is not breathing): CPR (Attempt to Resuscitate)  Medical Interventions (Patient has pulse or is breathing): Full Support

## 2022-07-01 NOTE — OP NOTE
Operative Report    Patient Name:  Anibal Redding  YOB: 1960  [id number]  7/1/2022      PREOPERATIVE DIAGNOSIS: Acute cholecystitis       POSTOPERATIVE DIAGNOSIS: Same        PROCEDURE PERFORMED:     Laparoscopic cholecystectomy with intra-operative cholangiography        SURGEON: Irvin Griffin MD      ASSISTANT:         SPECIMENS: Gallbladder and contents        ANESTHESIA: General.     EBL: Minimal        FINDINGS:     1. Gallbladder in standard positioning     2. Intra-operative cholangiogram demonstrated excellent filling of the cystic, common, right and left hepatic ducts with good flow of contrast into the duodenum without retained stone or filling defect    3.  Fatty liver disease       INDICATIONS:      The patient is a 62 y.o. male with a history of abdominal pain, concerning for acute cholecystitis. Pre-operative imaging including CT scan confirmed the diagnosis. The risks and benefits of Laparoscopic cholecystectomy with cholangiography were discussed with the patient and their family and they agreed to proceed.        DESCRIPTION OF PROCEDURE:     After obtaining informed consent, the patient was taken to the operating room and placed in the supine position. After appropriate DVT and antibiotic prophylaxis, general anesthesia was induced. The abdomen was prepped and draped in standard sterile fashion, and after infiltrating the skin with local anesthetic, a 5 mm skin incision was made inferior to the right costal margin in the anterior axillary line.  An optically guided try 5 mm trocar was advanced without difficulty into the abdominal cavity.  The abdomen was insufflated with carbon dioxide gas to a pressure of 15 mmHg, and a laparoscope advanced through the trocar and the abdominal contents were inspected. There was no evidence of bowel, bladder, or visceral injury with entrance of the trocar . At this point, after infiltrating the skin with local anesthetic, a standard  "laparoscopic cholecystectomy trocar placement schema was followed.  There were adhesions near the omentum and the camera trocar site was placed just superior to this.  No bowel was encountered.     The gallbladder was grasped and elevated superiorly.  It was acutely inflamed.  Using meticulous blunt dissection , the cystic duct and cystic artery were bluntly dissected free of other structures and clearly identified using the \"Critical View\" technique. The cystic artery was then clipped twice proximally and once distally, and divided between the clips.     The cystic duct was then clipped at its junction with the infundibulum of the gallbladder, and transected across 50% of its circumference. A cholangiogram catheter was then placed within the duct, and on-table cholangiography under fluoroscopy was obtained. There was excellent filling of the cystic, common, right and left hepatic ducts with good flow of contrast into the duodenum without retained stone or filling defect  . The cholangiogram catheter was then removed, and the cystic duct was ligated using a 2-0 silk suture tied laparoscopically,  reinforced with hemoclips, and divided.     The gallbladder was then dissected free of the gallbladder fossa using a combination of electrocautery and blunt dissection . There was a small posterior branch of the artery that was clipped and divided.  In addition, there was some slight bleeding from some mild omental adhesions that were controlled with Hemoclip application as well, given the patient's history of Eliquis use.The gallbladder was then placed in an Endo Catch bag, and removed from the subxiphoid trocar site. It was inspected on the back table, correlated with intra-operative findings, and passed off as specimen.  Using a 0 Vicryl suture and a laparoscopic suture passer, a figure-of-eight suture was placed around the fascia at the subxiphoid trocar site.     The right upper quadrant was then inspected. The cystic " duct and cystic artery stumps were intact without bleeding or biliary leak. The right upper quadrant was irrigated with saline until clear. The abdomen was deflated and reinsufflated to make sure pneumoperitoneum was not tamponading any bleeding and there was none.  The abdomen was again irrigated with saline until clear, and all trocars removed under direct and laparoscopic visualization. The fascia at the xiphoid incision was closed using the previously placed 0 Vicryl suture. The wounds were irrigated with normal saline, and closed in each area using absorbable subcuticular suture. The incisions were dressed in standard sterile fashion and covered with dry dressings. The patient recovered from anesthesia, was extubated in the operating room, and transferred to the PACU in stable condition.  All sponge and needle counts were correct times two at the completion of the procedure.     COMPLICATIONS: None           Irvin Griffin MD  7/1/2022  14:59 EDT

## 2022-07-01 NOTE — PLAN OF CARE
Goal Outcome Evaluation:  Plan of Care Reviewed With: patient        Progress: improving  Outcome Evaluation: Patient awake. pain under control, patient needs to ambulat and use IS. Waiting for flatus to advance diet.

## 2022-07-01 NOTE — BRIEF OP NOTE
CHOLECYSTECTOMY LAPAROSCOPIC INTRAOPERATIVE CHOLANGIOGRAM  Progress Note    Anibal Redding  7/1/2022    Pre-op Diagnosis:   Acute cholecystitis       Post-Op Diagnosis Codes:  Same    Procedure/CPT® Codes:        Procedure(s):  CHOLECYSTECTOMY LAPAROSCOPIC INTRAOPERATIVE CHOLANGIOGRAM    Surgeon(s):  Irvin Griffin MD    Anesthesia: General    Staff:   Circulator: Irvin Sidhu RN; Kim Kent RN  Scrub Person: Everman, April         Estimated Blood Loss: minimal    Urine Voided: * No values recorded between 7/1/2022  1:35 PM and 7/1/2022  2:55 PM *    Specimens:                Specimens     ID Source Type Tests Collected By Collected At Frozen?    A Gallbladder Tissue · TISSUE PATHOLOGY EXAM   Irvin Griffin MD 7/1/22 1411     This specimen was not marked as sent.                Drains: * No LDAs found *    Findings:   1.  Acutely inflamed gallbladder  2. IOC (-)        Complications: None          Irvin Griffin MD     Date: 7/1/2022  Time: 14:58 EDT

## 2022-07-02 VITALS
RESPIRATION RATE: 18 BRPM | DIASTOLIC BLOOD PRESSURE: 75 MMHG | OXYGEN SATURATION: 91 % | HEART RATE: 75 BPM | WEIGHT: 185 LBS | HEIGHT: 72 IN | SYSTOLIC BLOOD PRESSURE: 130 MMHG | BODY MASS INDEX: 25.06 KG/M2 | TEMPERATURE: 98 F

## 2022-07-02 PROBLEM — K81.0 ACUTE CHOLECYSTITIS: Status: RESOLVED | Noted: 2022-07-01 | Resolved: 2022-07-02

## 2022-07-02 LAB — GLUCOSE BLDC GLUCOMTR-MCNC: 128 MG/DL (ref 70–130)

## 2022-07-02 PROCEDURE — 82962 GLUCOSE BLOOD TEST: CPT

## 2022-07-02 PROCEDURE — 99217 PR OBSERVATION CARE DISCHARGE MANAGEMENT: CPT | Performed by: FAMILY MEDICINE

## 2022-07-02 PROCEDURE — G0378 HOSPITAL OBSERVATION PER HR: HCPCS

## 2022-07-02 PROCEDURE — 25010000002 PIPERACILLIN SOD-TAZOBACTAM PER 1 G: Performed by: SURGERY

## 2022-07-02 RX ORDER — OXYCODONE HYDROCHLORIDE AND ACETAMINOPHEN 5; 325 MG/1; MG/1
1 TABLET ORAL EVERY 4 HOURS PRN
Qty: 17 TABLET | Refills: 0 | Status: SHIPPED | OUTPATIENT
Start: 2022-07-02

## 2022-07-02 RX ORDER — DOCUSATE SODIUM 100 MG/1
100 CAPSULE, LIQUID FILLED ORAL 2 TIMES DAILY
Qty: 20 CAPSULE | Refills: 0 | Status: SHIPPED | OUTPATIENT
Start: 2022-07-02

## 2022-07-02 RX ORDER — METRONIDAZOLE 500 MG/1
500 TABLET ORAL 3 TIMES DAILY
Qty: 15 TABLET | Refills: 0 | Status: SHIPPED | OUTPATIENT
Start: 2022-07-02

## 2022-07-02 RX ORDER — AMOXICILLIN AND CLAVULANATE POTASSIUM 500; 125 MG/1; MG/1
1 TABLET, FILM COATED ORAL 3 TIMES DAILY
Qty: 15 TABLET | Refills: 0 | Status: SHIPPED | OUTPATIENT
Start: 2022-07-02

## 2022-07-02 RX ADMIN — METOPROLOL TARTRATE 100 MG: 100 TABLET, FILM COATED ORAL at 09:37

## 2022-07-02 RX ADMIN — FENOFIBRATE 145 MG: 145 TABLET ORAL at 09:37

## 2022-07-02 RX ADMIN — OXYCODONE HYDROCHLORIDE AND ACETAMINOPHEN 2 TABLET: 5; 325 TABLET ORAL at 04:48

## 2022-07-02 RX ADMIN — OXYCODONE HYDROCHLORIDE AND ACETAMINOPHEN 2 TABLET: 5; 325 TABLET ORAL at 09:36

## 2022-07-02 RX ADMIN — TAZOBACTAM SODIUM AND PIPERACILLIN SODIUM 3.38 G: 375; 3 INJECTION, SOLUTION INTRAVENOUS at 01:44

## 2022-07-02 RX ADMIN — TAZOBACTAM SODIUM AND PIPERACILLIN SODIUM 3.38 G: 375; 3 INJECTION, SOLUTION INTRAVENOUS at 09:36

## 2022-07-02 RX ADMIN — BISACODYL 10 MG: 5 TABLET, COATED ORAL at 09:37

## 2022-07-02 RX ADMIN — LOSARTAN POTASSIUM 50 MG: 50 TABLET, FILM COATED ORAL at 09:37

## 2022-07-02 RX ADMIN — TAMSULOSIN HYDROCHLORIDE 0.4 MG: 0.4 CAPSULE ORAL at 09:44

## 2022-07-02 RX ADMIN — OXYCODONE HYDROCHLORIDE AND ACETAMINOPHEN 2 TABLET: 5; 325 TABLET ORAL at 00:47

## 2022-07-02 RX ADMIN — DOCUSATE SODIUM 100 MG: 100 CAPSULE, LIQUID FILLED ORAL at 09:37

## 2022-07-02 NOTE — PLAN OF CARE
Goal Outcome Evaluation:  Plan of Care Reviewed With: patient        Progress: improving  Outcome Evaluation: Patient eating, ambulating and passing flatus. Pain controlled with Percocet.

## 2022-07-02 NOTE — PLAN OF CARE
Problem: Asthma Comorbidity  Goal: Maintenance of Asthma Control  Outcome: Ongoing, Progressing   Goal Outcome Evaluation:              Outcome Evaluation: VSS. RA. PRN's controlling pain. Pt able to pass flatus, diet advanced to full liquid. Ambulated in hallway during the shift. Voiding appropriately. No other concerns at this time. Will continue with the plan of care.

## 2022-07-02 NOTE — DISCHARGE SUMMARY
[facility] Medicine Services  DISCHARGE SUMMARY    Patient Name: Anibal Redding  : 1960  MRN: [id number]    Date of Admission: 2022  9:44 PM  Date of Discharge:  2022  Primary Care Physician: Andrew Powell MD    Consults     Date and Time Order Name Status Description    2022  4:00 AM Inpatient General Surgery Consult Completed     2022  2:14 AM Inpatient General Surgery Consult Completed           Hospital Course     Presenting Problem:   Acute cholecystitis [K81.0]    Active Hospital Problems    Diagnosis  POA   • Essential hypertension [I10]  Yes   • Hyperlipidemia [E78.5]  Yes   • Type 2 diabetes mellitus (HCC) [E11.9]  Yes   • Hyponatremia [E87.1]  Yes   • DVT (deep venous thrombosis) ([facility]) [I82.409]  Yes      Resolved Hospital Problems    Diagnosis Date Resolved POA   • **Acute cholecystitis [K81.0] 2022 Yes          Hospital Course:  Anibal Redding is a 62 y.o. male with a history of HTN, HLD, T2DM, and DVT (currently on Eliquis) who presents to [facility] ED for complaint of epigastric abdominal pain that began yesterday evening.      Acute Cholecystitis  -S/P Lap CCY per Dr Griffin 7/3  -Discussed case with Dr Griffin today, OK To SC home. Rx sent for ABX, pain medication, stool softener  -No lifting over 30 pounds for 4 weeks   -Follow-up in 4 weeks      DVT  -Dx on 2022. OK to resume Eliquis 7/3 per Dr Griffin      Hypertension  Hyperlipidemia  -Continue home Losartan and Metoprolol  -Continue statin     Type 2 Diabetes Mellitus  -Resume home regimen at DC    Discharge Follow Up Recommendations for outpatient labs/diagnostics:  -PCP 1 week  -Dr Griffin 1 month     Day of Discharge     HPI:   Patient seen and examined. Tolerating diet. Pain controlled. No fevers.    Review of Systems  Gen- No fevers, chills  CV- No chest pain, palpitations  Resp- No cough, dyspnea  GI- No N/V/D, abd pain    Vital Signs:   Temp:  [97.7 °F (36.5  °C)-100.1 °F (37.8 °C)] 98 °F (36.7 °C)  Heart Rate:  [58-90] 76  Resp:  [14-18] 18  BP: ()/(55-81) 130/75  Flow (L/min):  [2-8] 2      Physical Exam:  Constitutional: No acute distress, awake, alert  HENT: NCAT, mucous membranes moist  Respiratory: Clear to auscultation bilaterally, respiratory effort normal   Cardiovascular: RRR, no murmurs, rubs, or gallops  Gastrointestinal: Positive bowel sounds, soft, nontender, nondistended  Musculoskeletal: No bilateral ankle edema  Psychiatric: Appropriate affect, cooperative  Neurologic: Oriented x 3, speech clear  Skin: No rashes, incisions c/d/i    Pertinent  and/or Most Recent Results     LAB RESULTS:      Lab 07/01/22 0923 07/01/22 0236 06/30/22  1657   WBC 8.58  --  9.16   HEMOGLOBIN 14.5  --  16.4   HEMATOCRIT 42.8  --  47.7   PLATELETS 130*  --  145   NEUTROS ABS 6.93  --  7.18*   IMMATURE GRANS (ABS) 0.03  --  0.04   LYMPHS ABS 0.78  --  1.14   MONOS ABS 0.69  --  0.59   EOS ABS 0.12  --  0.14   MCV 81.7  --  81.3   LACTATE 1.2 3.6* 1.8         Lab 07/01/22  0923 06/30/22  1657   SODIUM 135* 132*   POTASSIUM 3.6 3.8   CHLORIDE 101 95*   CO2 25.0 29.0   ANION GAP 9.0 8.0   BUN 11 14   CREATININE 0.98 1.14   EGFR 86.6 72.3   GLUCOSE 181* 271*   CALCIUM 8.3* 9.4   HEMOGLOBIN A1C 8.90*  --          Lab 06/30/22  1657   TOTAL PROTEIN 7.0   ALBUMIN 4.20   GLOBULIN 2.8   ALT (SGPT) 11   AST (SGOT) 13   BILIRUBIN 0.6   ALK PHOS 93   LIPASE 23             Lab 07/01/22 0923   CHOLESTEROL 202*   LDL CHOL 89   HDL CHOL 25*   TRIGLYCERIDES 533*             Brief Urine Lab Results     None        Microbiology Results (last 10 days)     Procedure Component Value - Date/Time    Blood Culture - Blood, Arm, Right [[id number]]  (Normal) Collected: 07/01/22 3169    Lab Status: Preliminary result Specimen: Blood from Arm, Right Updated: 07/02/22 0347     Blood Culture No growth at 24 hours    COVID PRE-OP / PRE-PROCEDURE SCREENING ORDER (NO ISOLATION) - Swab, Nasopharynx  [[id number]]  (Normal) Collected: 07/01/22 0231    Lab Status: Final result Specimen: Swab from Nasopharynx Updated: 07/01/22 0318    Narrative:      The following orders were created for panel order COVID PRE-OP / PRE-PROCEDURE SCREENING ORDER (NO ISOLATION) - Swab, Nasopharynx.  Procedure                               Abnormality         Status                     ---------                               -----------         ------                     COVID-19 and FLU A/B PCR...[[id number]]  Normal              Final result                 Please view results for these tests on the individual orders.    COVID-19 and FLU A/B PCR - Swab, Nasopharynx [[id number]]  (Normal) Collected: 07/01/22 0231    Lab Status: Final result Specimen: Swab from Nasopharynx Updated: 07/01/22 0318     COVID19 Not Detected     Influenza A PCR Not Detected     Influenza B PCR Not Detected    Narrative:      Fact sheet for providers: https://www.fda.gov/media/496608/download    Fact sheet for patients: https://www.fda.gov/media/103607/download    Test performed by PCR.    Blood Culture - Blood, Arm, Left [[id number]]  (Normal) Collected: 07/01/22 0230    Lab Status: Preliminary result Specimen: Blood from Arm, Left Updated: 07/02/22 0718     Blood Culture No growth at 24 hours          CT Abdomen Pelvis With Contrast    Result Date: 6/30/2022  EXAM: CT OF THE ABDOMEN AND PELVIS WITH IV CONTRAST INDICATIONS: Epigastric pain and abdominal distention TECHNIQUE: CT scan of the abdomen and pelvis was performed following the administration of 100 mL Isovue-370 intravenous contrast. CT dose lowering techniques were used, to include: automated exposure control, adjustment for patient size, and / or use of  iterative reconstruction. COMPARISON: No prior abdominal or pelvic CTs are in the system. FINDINGS: Bones: No destructive osseous lesions. Lung bases: Unremarkable ABDOMEN: Liver: The liver is normal in contour without focal lesion. The portal venous  system is patent. Gallbladder and Bile Ducts: Edema surrounds a distended, thick-walled, stone containing gallbladder. There is no intrahepatic or extrahepatic biliary ductal dilatation. Spleen: There is homogeneous enhancement without focal lesion. Pancreas: The pancreatic parenchyma is unremarkable. There is no pancreatic ductal dilatation. Adrenals: Unremarkable without nodularity. Kidneys: There is symmetric enhancement without hydronephrosis. A hypoattenuating structure in the right kidney is too small to characterize. Vasculature: Minimal atherosclerotic calcifications are present. There is no abdominal aortic aneurysm. Incidentally noted is a retroaortic left renal vein. Nodes: There is no lymphadenopathy by size criteria. Bowel: There is no bowel obstruction. Status post sigmoid colectomy. Scattered colonic diverticula are present without evidence of acute diverticulitis. The appendix is not identified, however there are no inflammatory changes at the cecal base. Mesentery/Peritoneum: Fat-containing periumbilical ventral hernias are present. Soft Tissues: Unremarkable PELVIS: Pelvic Organs: There is no abnormal pelvic mass. The urinary bladder is unremarkable.     1. Edema surrounds a distended, thick-walled, stone containing gallbladder. Findings are concerning for acute calculus cholecystitis. 2. Scattered colonic diverticula. 3. Fat-containing periumbilical ventral hernias. 4. Status post sigmoid colectomy. Question prior appendectomy as well. Electronically signed by:  Peterson Staples M.D.  6/30/2022 9:29 PM Mountain Time    [place] Cholangiogram Operative    Result Date: 7/1/2022  DATE OF EXAM: 7/1/2022 2:04 PM  PROCEDURE: [place] CHOLANGIOGRAM OPERATIVE-  INDICATIONS: IOC; K81.0-Acute cholecystitis; E87.2-Acidosis; E11.65-Type 2 diabetes mellitus with hyperglycemia; R10.9-Unspecified abdominal pain  COMPARISON: No comparisons available.  TECHNIQUE: Digital spot images were obtained from an intraoperative  cholangiogram procedure performed by the surgeon.      FINDINGS/IMPRESSION: 10 seconds of fluoroscopy time provided with 2 images saved during intraoperative cholangiogram, demonstrating no focal filling defect concerning for biliary stone.  This report was finalized on 7/1/2022 2:41 PM by Dimitri Chavarria.      CT Angiogram Chest    Result Date: 6/30/2022  CTA CHEST WITH CONTRAST CLINICAL INDICATION: Chest pain, currently being treated for DVT COMPARISON: None. TECHNIQUE: 100 mL Isovue-370 administered intravenously. CTA images of the chest were obtained. Three-dimensional volume reconstructions were generated. CT dose lowering techniques were used, to include: automated exposure control, adjustment for patient  size, and or use of iterative reconstruction. FINDINGS: CTA: No pulmonary arterial filling defect. Heart size normal without pericardial effusion. Mediastinal vessels normal in caliber and patency. Mediastinum: No mediastinal or hilar lymphadenopathy. Trachea and central airways are patent. Thyroid is normal. Esophagus is normal. Lungs and pleura: Lungs are clear without focal consolidation, pleural effusion or pneumothorax. Upper abdomen: Calcified stone in the region of the gallbladder neck. Remainder the gallbladder is partially visualized. Body wall: No acute abnormality. Bones: No acute osseous abnormality.     1.  No CT evidence of acute pulmonary embolus. No acute cardiopulmonary abnormality. 2.  Gallstone in the region of the gallbladder neck. Remainder of the gallbladder is not visualized. This could be further evaluated with dedicated ultrasound if clinically warranted. Electronically signed by:  Natalya Romo  6/30/2022 9:44 PM Mountain Time      Results for orders placed during the hospital encounter of 05/04/22    Duplex venous lower extremity right    Interpretation Summary  · Acute right lower extremity deep vein thrombosis noted in the mid femoral, distal femoral, popliteal, posterior tibial,  peroneal and gastrocnemius.      Results for orders placed during the hospital encounter of 05/04/22    Duplex venous lower extremity right    Interpretation Summary  · Acute right lower extremity deep vein thrombosis noted in the mid femoral, distal femoral, popliteal, posterior tibial, peroneal and gastrocnemius.          Plan for Follow-up of Pending Labs/Results: Inbox   Pending Labs     Order Current Status    Tissue Pathology Exam In process    Blood Culture - Blood, Arm, Left Preliminary result    Blood Culture - Blood, Arm, Right Preliminary result        Discharge Details        Discharge Medications      New Medications      Instructions Start Date   amoxicillin-clavulanate 500-125 MG per tablet  Commonly known as: Augmentin   500 mg, Oral, 3 Times Daily      docusate sodium 100 MG capsule  Commonly known as: COLACE   100 mg, Oral, 2 Times Daily      metroNIDAZOLE 500 MG tablet  Commonly known as: Flagyl   500 mg, Oral, 3 Times Daily      oxyCODONE-acetaminophen 5-325 MG per tablet  Commonly known as: PERCOCET   1 tablet, Oral, Every 4 Hours PRN         Changes to Medications      Instructions Start Date   apixaban 5 MG tablet tablet  Commonly known as: ELIQUIS  What changed: additional instructions   5 mg, Oral, Every 12 Hours Scheduled, OK TO RESUME 7/3 PER DR POLLACK         Continue These Medications      Instructions Start Date   empagliflozin 25 MG tablet tablet  Commonly known as: JARDIANCE   25 mg, Oral, Daily      Januvia 100 MG tablet  Generic drug: SITagliptin   No dose, route, or frequency recorded.      losartan 50 MG tablet  Commonly known as: COZAAR   1 tablet, Oral, 2 Times Daily      metFORMIN 1000 MG tablet  Commonly known as: GLUCOPHAGE   500 mg, 2 Times Daily With Meals      metoprolol tartrate 100 MG tablet  Commonly known as: LOPRESSOR   1 tablet, Oral, 2 Times Daily             Allergies   Allergen Reactions   • Cefaclor Hives   • Amlodipine Hives   • Benazepril Hives         Discharge  Disposition:  Home or Self Care    Diet:  Hospital:  Diet Order   Procedures   • Diet Full Liquid; Consistent Carbohydrate       Activity:      Restrictions or Other Recommendations:       CODE STATUS:    Code Status and Medical Interventions:   Ordered at: 07/01/22 0253     Code Status (Patient has no pulse and is not breathing):    CPR (Attempt to Resuscitate)     Medical Interventions (Patient has pulse or is breathing):    Full Support       No future appointments.    Additional Instructions for the Follow-ups that You Need to Schedule     Discharge Follow-up with PCP   As directed       Currently Documented PCP:    Andrew Powell MD    PCP Phone Number:    [phone]     Follow Up Details: 1 WEEK         Discharge Follow-up with Specialty: Dr. Griffin; 1 Month   As directed      Specialty: Dr. Griffin    Follow Up: 1 Month         Discharge Follow-up with Specified Provider: DR GRIFFIN; 1 Month   As directed      To: DR GRIFFIN    Follow Up: 1 Month                     La Morgan DO  07/02/22      Time Spent on Discharge:  I spent  48  minutes on this discharge activity which included: face-to-face encounter with the patient, reviewing the data in the system, coordination of the care with the nursing staff as well as consultants, documentation, and entering orders.

## 2022-07-02 NOTE — PROGRESS NOTES
"Patient Name:  Anibal Redding  YOB: 1960  [id number]    Surgery Progress Note    Date of visit: 7/2/2022    Subjective   Subjective: Feeling much better, gas pain resolved, wants to go home.         Objective     Objective:     /75 (BP Location: Left arm, Patient Position: Lying)   Pulse 76   Temp 98 °F (36.7 °C) (Oral)   Resp 18   Ht 182.9 cm (72\")   Wt 83.9 kg (185 lb)   SpO2 94%   BMI 25.09 kg/m²     Intake/Output Summary (Last 24 hours) at 7/2/2022 0926  Last data filed at 7/2/2022 0718  Gross per 24 hour   Intake 1220 ml   Output 1500 ml   Net -280 ml       CV:  Rhythm  regular and rate regular   L:  Clear  to auscultation bilaterally   Abd:  Bowel sounds positive , soft, appropriately tender. Dressings c/d/i  Ext:  No cyanosis, clubbing, edema    Recent labs that are back at this time have been reviewed.            Assessment/ Plan:    Problem List Items Addressed This Visit        Gastrointestinal Abdominal     * (Principal) Acute cholecystitis - Primary- Doing well after Lap CCY. OK to go home from my standpoint. PO antibiotics x 5 days. RTC with me in 4 weeks. No lifting > 30 lbs for 4 weeks.      Relevant Medications    oxyCODONE-acetaminophen (PERCOCET) 5-325 MG per tablet      Other Visit Diagnoses     Lactic acidosis        Hyperglycemia due to diabetes mellitus (HCC)        Abdominal pain        Relevant Orders    Tissue Pathology Exam           Active Hospital Problems    Diagnosis  POA   • **Acute cholecystitis [K81.0]  Yes   • Essential hypertension [I10]  Yes   • Hyperlipidemia [E78.5]  Yes   • Type 2 diabetes mellitus (HCC) [E11.9]  Yes   • Hyponatremia [E87.1]  Yes   • DVT (deep venous thrombosis) (HCC) [I82.409]  Yes      Resolved Hospital Problems   No resolved problems to display.              Irvin Griffin MD  7/2/2022  09:26 EDT      "

## 2022-07-06 LAB
BACTERIA SPEC AEROBE CULT: NORMAL
BACTERIA SPEC AEROBE CULT: NORMAL
CYTO UR: NORMAL
LAB AP CASE REPORT: NORMAL
LAB AP CLINICAL INFORMATION: NORMAL
PATH REPORT.FINAL DX SPEC: NORMAL
PATH REPORT.GROSS SPEC: NORMAL

## (undated) DEVICE — SUT VIC 0 UR6 27IN VCP603H

## (undated) DEVICE — SYR LUERLOK 30CC

## (undated) DEVICE — ENDOPATH XCEL BLADELESS TROCARS WITH STABILITY SLEEVES: Brand: ENDOPATH XCEL

## (undated) DEVICE — STPCK 4WY ON/OFF VLV M/COLAR FIT 45PSI STRL

## (undated) DEVICE — GOWN,PREVENTION PLUS,XXLARGE,STERILE: Brand: MEDLINE

## (undated) DEVICE — LAPAROSCOPIC SMOKE FILTRATION SYSTEM: Brand: PALL LAPAROSHIELD® PLUS LAPAROSCOPIC SMOKE FILTRATION SYSTEM

## (undated) DEVICE — [HIGH FLOW INSUFFLATOR,  DO NOT USE IF PACKAGE IS DAMAGED,  KEEP DRY,  KEEP AWAY FROM SUNLIGHT,  PROTECT FROM HEAT AND RADIOACTIVE SOURCES.]: Brand: PNEUMOSURE

## (undated) DEVICE — ENDOPOUCH RETRIEVER SPECIMEN RETRIEVAL BAGS: Brand: ENDOPOUCH RETRIEVER

## (undated) DEVICE — KT WARM LAP LIQUIDSCOPE/HELPOR W/WARMOR/CLTH 2/TROC/SWAB

## (undated) DEVICE — CATH CHOLANG 7.5F18IN BLU

## (undated) DEVICE — ENDOCUT SCISSOR TIP, DISPOSABLE: Brand: RENEW

## (undated) DEVICE — PK LAP LASR CHOLE 10

## (undated) DEVICE — ST EXT IV TBG W SECUR LK 20IN

## (undated) DEVICE — PATIENT RETURN ELECTRODE, SINGLE-USE, CONTACT QUALITY MONITORING, ADULT, WITH 9FT CORD, FOR PATIENTS WEIGING OVER 33LBS. (15KG): Brand: MEGADYNE

## (undated) DEVICE — ANTIBACTERIAL UNDYED BRAIDED (POLYGLACTIN 910), SYNTHETIC ABSORBABLE SUTURE: Brand: COATED VICRYL

## (undated) DEVICE — GLV SURG SENSICARE PI MIC PF SZ7.5 LF STRL

## (undated) DEVICE — GLV SURG SENSICARE PI MIC PF SZ7 LF STRL

## (undated) DEVICE — ENDOPATH XCEL UNIVERSAL TROCAR STABLILITY SLEEVES: Brand: ENDOPATH XCEL

## (undated) DEVICE — FEEDING TUBE: Brand: ARGYLE

## (undated) DEVICE — SNAP KOVER: Brand: UNBRANDED

## (undated) DEVICE — SYR LUERLOK 20CC BX/50

## (undated) DEVICE — SUT SILK 2/0 TIES 18IN A185H